# Patient Record
Sex: FEMALE | Race: WHITE | Employment: OTHER | URBAN - NONMETROPOLITAN AREA
[De-identification: names, ages, dates, MRNs, and addresses within clinical notes are randomized per-mention and may not be internally consistent; named-entity substitution may affect disease eponyms.]

---

## 2023-08-04 ENCOUNTER — NURSE ONLY (OUTPATIENT)
Age: 78
End: 2023-08-04

## 2023-08-04 LAB
ANION GAP SERPL CALC-SCNC: 13 MEQ/L (ref 8–16)
BUN SERPL-MCNC: 28 MG/DL (ref 7–22)
CALCIUM SERPL-MCNC: 9.8 MG/DL (ref 8.5–10.5)
CHLORIDE SERPL-SCNC: 101 MEQ/L (ref 98–111)
CO2 SERPL-SCNC: 28 MEQ/L (ref 23–33)
CREAT SERPL-MCNC: 0.8 MG/DL (ref 0.4–1.2)
GFR SERPL CREATININE-BSD FRML MDRD: > 60 ML/MIN/1.73M2
GLUCOSE SERPL-MCNC: 156 MG/DL (ref 70–108)
POTASSIUM SERPL-SCNC: 3.6 MEQ/L (ref 3.5–5.2)
SODIUM SERPL-SCNC: 142 MEQ/L (ref 135–145)

## 2023-11-13 ENCOUNTER — NURSE ONLY (OUTPATIENT)
Age: 78
End: 2023-11-13

## 2023-11-13 LAB
ALBUMIN SERPL BCG-MCNC: 3.9 G/DL (ref 3.5–5.1)
ALP SERPL-CCNC: 173 U/L (ref 38–126)
ALT SERPL W/O P-5'-P-CCNC: 17 U/L (ref 11–66)
ANION GAP SERPL CALC-SCNC: 12 MEQ/L (ref 8–16)
AST SERPL-CCNC: 17 U/L (ref 5–40)
BASOPHILS ABSOLUTE: 0.1 THOU/MM3 (ref 0–0.1)
BASOPHILS NFR BLD AUTO: 1 %
BILIRUB SERPL-MCNC: 0.3 MG/DL (ref 0.3–1.2)
BUN SERPL-MCNC: 28 MG/DL (ref 7–22)
CALCIUM SERPL-MCNC: 9.7 MG/DL (ref 8.5–10.5)
CHLORIDE SERPL-SCNC: 102 MEQ/L (ref 98–111)
CHOLEST SERPL-MCNC: 174 MG/DL (ref 100–199)
CO2 SERPL-SCNC: 30 MEQ/L (ref 23–33)
CREAT SERPL-MCNC: 0.9 MG/DL (ref 0.4–1.2)
DEPRECATED MEAN GLUCOSE BLD GHB EST-ACNC: 129 MG/DL (ref 70–126)
DEPRECATED RDW RBC AUTO: 48.1 FL (ref 35–45)
EOSINOPHIL NFR BLD AUTO: 3.2 %
EOSINOPHILS ABSOLUTE: 0.3 THOU/MM3 (ref 0–0.4)
ERYTHROCYTE [DISTWIDTH] IN BLOOD BY AUTOMATED COUNT: 14.9 % (ref 11.5–14.5)
GFR SERPL CREATININE-BSD FRML MDRD: > 60 ML/MIN/1.73M2
GLUCOSE SERPL-MCNC: 120 MG/DL (ref 70–108)
HBA1C MFR BLD HPLC: 6.3 % (ref 4.4–6.4)
HCT VFR BLD AUTO: 39.4 % (ref 37–47)
HDLC SERPL-MCNC: 49 MG/DL
HGB BLD-MCNC: 12.2 GM/DL (ref 12–16)
IMM GRANULOCYTES # BLD AUTO: 0.02 THOU/MM3 (ref 0–0.07)
IMM GRANULOCYTES NFR BLD AUTO: 0.2 %
LDLC SERPL CALC-MCNC: 104 MG/DL
LYMPHOCYTES ABSOLUTE: 1.8 THOU/MM3 (ref 1–4.8)
LYMPHOCYTES NFR BLD AUTO: 20.5 %
MCH RBC QN AUTO: 27.4 PG (ref 26–33)
MCHC RBC AUTO-ENTMCNC: 31 GM/DL (ref 32.2–35.5)
MCV RBC AUTO: 88.3 FL (ref 81–99)
MONOCYTES ABSOLUTE: 0.8 THOU/MM3 (ref 0.4–1.3)
MONOCYTES NFR BLD AUTO: 9.2 %
NEUTROPHILS NFR BLD AUTO: 65.9 %
NRBC BLD AUTO-RTO: 0 /100 WBC
PLATELET # BLD AUTO: 243 THOU/MM3 (ref 130–400)
PMV BLD AUTO: 11.8 FL (ref 9.4–12.4)
POTASSIUM SERPL-SCNC: 3.8 MEQ/L (ref 3.5–5.2)
PROT SERPL-MCNC: 6.8 G/DL (ref 6.1–8)
RBC # BLD AUTO: 4.46 MILL/MM3 (ref 4.2–5.4)
SEGMENTED NEUTROPHILS ABSOLUTE COUNT: 5.7 THOU/MM3 (ref 1.8–7.7)
SODIUM SERPL-SCNC: 144 MEQ/L (ref 135–145)
T4 FREE SERPL-MCNC: 1.09 NG/DL (ref 0.93–1.76)
TRIGL SERPL-MCNC: 106 MG/DL (ref 0–199)
TSH SERPL DL<=0.005 MIU/L-ACNC: 2.67 UIU/ML (ref 0.4–4.2)
VIT B12 SERPL-MCNC: 180 PG/ML (ref 211–911)
WBC # BLD AUTO: 8.7 THOU/MM3 (ref 4.8–10.8)

## 2023-11-15 LAB — PYRIDOXAL PHOS SERPL-SCNC: 8.5 NMOL/L (ref 20–125)

## 2023-11-16 ENCOUNTER — NURSE ONLY (OUTPATIENT)
Age: 78
End: 2023-11-16

## 2023-11-19 LAB — ALKALINE PHOSPHATASE ISOENZYMES: NORMAL

## 2024-01-17 ENCOUNTER — NURSE ONLY (OUTPATIENT)
Age: 79
End: 2024-01-17

## 2024-01-17 LAB — PLATELET # BLD: 331 10*3/UL

## 2024-01-22 LAB
A2 MACROGLOB SERPL-MCNC: 278 MG/DL (ref 131–293)
ALT SERPL-CCNC: 25 U/L (ref 5–40)
ANNOTATION COMMENT IMP: ABNORMAL
AST SERPL-CCNC: 22 U/L (ref 9–40)
BUN SERPL-MCNC: 25 MG/DL (ref 7–20)
FIBROSIS STAGE SERPL QL: ABNORMAL
GGT SERPL-CCNC: 24 U/L (ref 7–33)
LIVER FIBR SCORE SERPL CALC.FIBROMETER: 0.36
PATHOLOGY STUDY: ABNORMAL
PLATELET # BLD: 331 K/UL
PT INDEX PPP: 96 % (ref 90–120)
REF LAB TEST RESULTS: 0.01
REF LAB TEST RESULTS: 0.24
REF LAB TEST RESULTS: ABNORMAL

## 2024-02-14 ENCOUNTER — NURSE ONLY (OUTPATIENT)
Age: 79
End: 2024-02-14

## 2024-02-14 LAB
ALBUMIN SERPL BCG-MCNC: 4 G/DL (ref 3.5–5.1)
ALP SERPL-CCNC: 226 U/L (ref 38–126)
ALT SERPL W/O P-5'-P-CCNC: 27 U/L (ref 11–66)
ANION GAP SERPL CALC-SCNC: 13 MEQ/L (ref 8–16)
AST SERPL-CCNC: 21 U/L (ref 5–40)
BILIRUB SERPL-MCNC: 0.4 MG/DL (ref 0.3–1.2)
BUN SERPL-MCNC: 28 MG/DL (ref 7–22)
CALCIUM SERPL-MCNC: 10.1 MG/DL (ref 8.5–10.5)
CHLORIDE SERPL-SCNC: 100 MEQ/L (ref 98–111)
CO2 SERPL-SCNC: 29 MEQ/L (ref 23–33)
CREAT SERPL-MCNC: 0.9 MG/DL (ref 0.4–1.2)
GFR SERPL CREATININE-BSD FRML MDRD: > 60 ML/MIN/1.73M2
GLUCOSE SERPL-MCNC: 139 MG/DL (ref 70–108)
MAGNESIUM SERPL-MCNC: 1.9 MG/DL (ref 1.6–2.4)
POTASSIUM SERPL-SCNC: 3.7 MEQ/L (ref 3.5–5.2)
PROT SERPL-MCNC: 7.2 G/DL (ref 6.1–8)
SODIUM SERPL-SCNC: 142 MEQ/L (ref 135–145)

## 2024-03-08 ENCOUNTER — HOSPITAL ENCOUNTER (INPATIENT)
Age: 79
LOS: 3 days | Discharge: HOME HEALTH | DRG: 552 | End: 2024-03-11
Attending: STUDENT IN AN ORGANIZED HEALTH CARE EDUCATION/TRAINING PROGRAM | Admitting: ORTHOPAEDIC SURGERY
Payer: MEDICARE

## 2024-03-08 DIAGNOSIS — S22.069A CLOSED FRACTURE OF EIGHTH THORACIC VERTEBRA, UNSPECIFIED FRACTURE MORPHOLOGY, INITIAL ENCOUNTER (HCC): Primary | ICD-10-CM

## 2024-03-08 PROCEDURE — 1200000000 HC SEMI PRIVATE

## 2024-03-08 PROCEDURE — 6370000000 HC RX 637 (ALT 250 FOR IP): Performed by: STUDENT IN AN ORGANIZED HEALTH CARE EDUCATION/TRAINING PROGRAM

## 2024-03-08 PROCEDURE — 6820000001 HC L2 TRAUMA SURGERY EVALUATION: Performed by: ORTHOPAEDIC SURGERY

## 2024-03-08 PROCEDURE — 99285 EMERGENCY DEPT VISIT HI MDM: CPT

## 2024-03-08 RX ORDER — TRAMADOL HYDROCHLORIDE 50 MG/1
50 TABLET ORAL 2 TIMES DAILY PRN
Status: ON HOLD | COMMUNITY
Start: 2024-01-04 | End: 2024-03-11

## 2024-03-08 RX ORDER — ACETAMINOPHEN 500 MG
1000 TABLET ORAL ONCE
Status: COMPLETED | OUTPATIENT
Start: 2024-03-08 | End: 2024-03-08

## 2024-03-08 RX ORDER — OXYBUTYNIN CHLORIDE 5 MG/1
10 TABLET, EXTENDED RELEASE ORAL EVERY EVENING
COMMUNITY
Start: 2023-12-17

## 2024-03-08 RX ORDER — OXYBUTYNIN CHLORIDE 10 MG/1
5 TABLET, EXTENDED RELEASE ORAL DAILY
COMMUNITY
Start: 2024-01-04

## 2024-03-08 RX ORDER — MONTELUKAST SODIUM 10 MG/1
10 TABLET ORAL NIGHTLY
Status: ON HOLD | COMMUNITY
Start: 2024-01-04 | End: 2024-03-09

## 2024-03-08 RX ORDER — NYSTATIN 100000 U/G
1 CREAM TOPICAL 2 TIMES DAILY
COMMUNITY

## 2024-03-08 RX ORDER — POLYETHYLENE GLYCOL 3350 17 G/17G
17 POWDER, FOR SOLUTION ORAL DAILY
COMMUNITY
Start: 2024-01-04

## 2024-03-08 RX ORDER — PANTOPRAZOLE SODIUM 40 MG/1
40 TABLET, DELAYED RELEASE ORAL DAILY
COMMUNITY

## 2024-03-08 RX ORDER — SENNOSIDES 8.6 MG
650 CAPSULE ORAL EVERY 8 HOURS PRN
COMMUNITY
Start: 2024-01-04

## 2024-03-08 RX ORDER — POTASSIUM CHLORIDE 750 MG/1
10 TABLET, FILM COATED, EXTENDED RELEASE ORAL DAILY
COMMUNITY
Start: 2023-12-24

## 2024-03-08 RX ORDER — FUROSEMIDE 20 MG/1
20 TABLET ORAL DAILY
COMMUNITY
Start: 2024-01-04

## 2024-03-08 RX ORDER — LOSARTAN POTASSIUM AND HYDROCHLOROTHIAZIDE 25; 100 MG/1; MG/1
1 TABLET ORAL DAILY
COMMUNITY
Start: 2024-01-04

## 2024-03-08 RX ORDER — ATORVASTATIN CALCIUM 10 MG
10 TABLET ORAL DAILY
COMMUNITY
Start: 2024-01-04

## 2024-03-08 RX ORDER — GABAPENTIN 300 MG
300 CAPSULE ORAL 3 TIMES DAILY
COMMUNITY
Start: 2024-01-04

## 2024-03-08 RX ORDER — TRAZODONE HYDROCHLORIDE 50 MG/1
50 TABLET ORAL NIGHTLY
COMMUNITY
Start: 2024-01-04

## 2024-03-08 RX ORDER — DILTIAZEM HYDROCHLORIDE 120 MG/1
120 TABLET, FILM COATED ORAL DAILY
COMMUNITY
Start: 2024-01-04

## 2024-03-08 RX ADMIN — ACETAMINOPHEN 1000 MG: 500 TABLET ORAL at 23:13

## 2024-03-08 ASSESSMENT — PAIN - FUNCTIONAL ASSESSMENT: PAIN_FUNCTIONAL_ASSESSMENT: 0-10

## 2024-03-08 ASSESSMENT — PAIN SCALES - GENERAL: PAINLEVEL_OUTOF10: 5

## 2024-03-09 LAB
ANION GAP SERPL CALC-SCNC: 13 MEQ/L (ref 8–16)
BASOPHILS ABSOLUTE: 0.1 THOU/MM3 (ref 0–0.1)
BASOPHILS NFR BLD AUTO: 0.7 %
BUN SERPL-MCNC: 19 MG/DL (ref 7–22)
CALCIUM SERPL-MCNC: 9.7 MG/DL (ref 8.5–10.5)
CHLORIDE SERPL-SCNC: 98 MEQ/L (ref 98–111)
CO2 SERPL-SCNC: 26 MEQ/L (ref 23–33)
CREAT SERPL-MCNC: 0.9 MG/DL (ref 0.4–1.2)
DEPRECATED RDW RBC AUTO: 50 FL (ref 35–45)
EOSINOPHIL NFR BLD AUTO: 2.3 %
EOSINOPHILS ABSOLUTE: 0.2 THOU/MM3 (ref 0–0.4)
ERYTHROCYTE [DISTWIDTH] IN BLOOD BY AUTOMATED COUNT: 15.9 % (ref 11.5–14.5)
GFR SERPL CREATININE-BSD FRML MDRD: > 60 ML/MIN/1.73M2
GLUCOSE SERPL-MCNC: 114 MG/DL (ref 70–108)
HCT VFR BLD AUTO: 36.9 % (ref 37–47)
HGB BLD-MCNC: 11.9 GM/DL (ref 12–16)
IMM GRANULOCYTES # BLD AUTO: 0.02 THOU/MM3 (ref 0–0.07)
IMM GRANULOCYTES NFR BLD AUTO: 0.2 %
LYMPHOCYTES ABSOLUTE: 0.8 THOU/MM3 (ref 1–4.8)
LYMPHOCYTES NFR BLD AUTO: 8.3 %
MCH RBC QN AUTO: 28 PG (ref 26–33)
MCHC RBC AUTO-ENTMCNC: 32.2 GM/DL (ref 32.2–35.5)
MCV RBC AUTO: 86.8 FL (ref 81–99)
MONOCYTES ABSOLUTE: 0.9 THOU/MM3 (ref 0.4–1.3)
MONOCYTES NFR BLD AUTO: 9.8 %
NEUTROPHILS NFR BLD AUTO: 78.7 %
NRBC BLD AUTO-RTO: 0 /100 WBC
PLATELET # BLD AUTO: 298 THOU/MM3 (ref 130–400)
PMV BLD AUTO: 9.9 FL (ref 9.4–12.4)
POTASSIUM SERPL-SCNC: 4 MEQ/L (ref 3.5–5.2)
RBC # BLD AUTO: 4.25 MILL/MM3 (ref 4.2–5.4)
SEGMENTED NEUTROPHILS ABSOLUTE COUNT: 7.4 THOU/MM3 (ref 1.8–7.7)
SODIUM SERPL-SCNC: 137 MEQ/L (ref 135–145)
WBC # BLD AUTO: 9.4 THOU/MM3 (ref 4.8–10.8)

## 2024-03-09 PROCEDURE — 6360000002 HC RX W HCPCS: Performed by: PHYSICIAN ASSISTANT

## 2024-03-09 PROCEDURE — 6370000000 HC RX 637 (ALT 250 FOR IP): Performed by: PHYSICIAN ASSISTANT

## 2024-03-09 PROCEDURE — 36415 COLL VENOUS BLD VENIPUNCTURE: CPT

## 2024-03-09 PROCEDURE — 6370000000 HC RX 637 (ALT 250 FOR IP)

## 2024-03-09 PROCEDURE — 2580000003 HC RX 258: Performed by: PHYSICIAN ASSISTANT

## 2024-03-09 PROCEDURE — 1200000000 HC SEMI PRIVATE

## 2024-03-09 PROCEDURE — L0456 TLSO FLEX TRNK SJ-SS PRE CST: HCPCS

## 2024-03-09 PROCEDURE — 80048 BASIC METABOLIC PNL TOTAL CA: CPT

## 2024-03-09 PROCEDURE — 99222 1ST HOSP IP/OBS MODERATE 55: CPT | Performed by: PHYSICIAN ASSISTANT

## 2024-03-09 PROCEDURE — 85025 COMPLETE CBC W/AUTO DIFF WBC: CPT

## 2024-03-09 RX ORDER — SODIUM CHLORIDE 0.9 % (FLUSH) 0.9 %
10 SYRINGE (ML) INJECTION PRN
Status: DISCONTINUED | OUTPATIENT
Start: 2024-03-09 | End: 2024-03-11 | Stop reason: HOSPADM

## 2024-03-09 RX ORDER — PANTOPRAZOLE SODIUM 40 MG/1
40 TABLET, DELAYED RELEASE ORAL
Status: DISCONTINUED | OUTPATIENT
Start: 2024-03-09 | End: 2024-03-11 | Stop reason: HOSPADM

## 2024-03-09 RX ORDER — DILTIAZEM HYDROCHLORIDE 60 MG/1
120 TABLET, FILM COATED ORAL DAILY
Status: DISCONTINUED | OUTPATIENT
Start: 2024-03-09 | End: 2024-03-11 | Stop reason: HOSPADM

## 2024-03-09 RX ORDER — OXYBUTYNIN CHLORIDE 10 MG/1
10 TABLET, EXTENDED RELEASE ORAL NIGHTLY
Status: DISCONTINUED | OUTPATIENT
Start: 2024-03-09 | End: 2024-03-11 | Stop reason: HOSPADM

## 2024-03-09 RX ORDER — ATORVASTATIN CALCIUM 10 MG/1
10 TABLET, FILM COATED ORAL DAILY
Status: DISCONTINUED | OUTPATIENT
Start: 2024-03-09 | End: 2024-03-11 | Stop reason: HOSPADM

## 2024-03-09 RX ORDER — POTASSIUM CHLORIDE 20 MEQ/1
40 TABLET, EXTENDED RELEASE ORAL PRN
Status: DISCONTINUED | OUTPATIENT
Start: 2024-03-09 | End: 2024-03-11 | Stop reason: HOSPADM

## 2024-03-09 RX ORDER — FUROSEMIDE 20 MG/1
20 TABLET ORAL DAILY
Status: DISCONTINUED | OUTPATIENT
Start: 2024-03-09 | End: 2024-03-11 | Stop reason: HOSPADM

## 2024-03-09 RX ORDER — PNV NO.95/FERROUS FUM/FOLIC AC 28MG-0.8MG
325 TABLET ORAL DAILY
COMMUNITY

## 2024-03-09 RX ORDER — POLYETHYLENE GLYCOL 3350 17 G/17G
17 POWDER, FOR SOLUTION ORAL DAILY PRN
Status: DISCONTINUED | OUTPATIENT
Start: 2024-03-09 | End: 2024-03-11 | Stop reason: HOSPADM

## 2024-03-09 RX ORDER — ONDANSETRON 2 MG/ML
4 INJECTION INTRAMUSCULAR; INTRAVENOUS EVERY 6 HOURS PRN
Status: DISCONTINUED | OUTPATIENT
Start: 2024-03-09 | End: 2024-03-11 | Stop reason: HOSPADM

## 2024-03-09 RX ORDER — POTASSIUM CHLORIDE 7.45 MG/ML
10 INJECTION INTRAVENOUS PRN
Status: DISCONTINUED | OUTPATIENT
Start: 2024-03-09 | End: 2024-03-11 | Stop reason: HOSPADM

## 2024-03-09 RX ORDER — MAGNESIUM SULFATE IN WATER 40 MG/ML
2000 INJECTION, SOLUTION INTRAVENOUS PRN
Status: DISCONTINUED | OUTPATIENT
Start: 2024-03-09 | End: 2024-03-11 | Stop reason: HOSPADM

## 2024-03-09 RX ORDER — ENOXAPARIN SODIUM 100 MG/ML
30 INJECTION SUBCUTANEOUS 2 TIMES DAILY
Status: DISCONTINUED | OUTPATIENT
Start: 2024-03-09 | End: 2024-03-09

## 2024-03-09 RX ORDER — HYDROCHLOROTHIAZIDE 25 MG/1
25 TABLET ORAL DAILY
Status: DISCONTINUED | OUTPATIENT
Start: 2024-03-09 | End: 2024-03-11 | Stop reason: HOSPADM

## 2024-03-09 RX ORDER — FERROUS SULFATE 325(65) MG
325 TABLET ORAL DAILY
Status: DISCONTINUED | OUTPATIENT
Start: 2024-03-09 | End: 2024-03-11 | Stop reason: HOSPADM

## 2024-03-09 RX ORDER — LOSARTAN POTASSIUM 100 MG/1
100 TABLET ORAL DAILY
Status: DISCONTINUED | OUTPATIENT
Start: 2024-03-09 | End: 2024-03-11 | Stop reason: HOSPADM

## 2024-03-09 RX ORDER — ONDANSETRON 4 MG/1
4 TABLET, ORALLY DISINTEGRATING ORAL EVERY 8 HOURS PRN
Status: DISCONTINUED | OUTPATIENT
Start: 2024-03-09 | End: 2024-03-11 | Stop reason: HOSPADM

## 2024-03-09 RX ORDER — SULFAMETHOXAZOLE AND TRIMETHOPRIM 800; 160 MG/1; MG/1
1 TABLET ORAL EVERY 12 HOURS SCHEDULED
Status: DISCONTINUED | OUTPATIENT
Start: 2024-03-09 | End: 2024-03-11

## 2024-03-09 RX ORDER — ACETAMINOPHEN 650 MG/1
650 SUPPOSITORY RECTAL EVERY 6 HOURS PRN
Status: DISCONTINUED | OUTPATIENT
Start: 2024-03-09 | End: 2024-03-11 | Stop reason: HOSPADM

## 2024-03-09 RX ORDER — TRAMADOL HYDROCHLORIDE 50 MG/1
50 TABLET ORAL 2 TIMES DAILY PRN
Status: DISCONTINUED | OUTPATIENT
Start: 2024-03-09 | End: 2024-03-11 | Stop reason: HOSPADM

## 2024-03-09 RX ORDER — GABAPENTIN 300 MG/1
300 CAPSULE ORAL 3 TIMES DAILY
Status: DISCONTINUED | OUTPATIENT
Start: 2024-03-09 | End: 2024-03-11 | Stop reason: HOSPADM

## 2024-03-09 RX ORDER — NYSTATIN 100000 U/G
CREAM TOPICAL 2 TIMES DAILY
Status: DISCONTINUED | OUTPATIENT
Start: 2024-03-09 | End: 2024-03-11 | Stop reason: HOSPADM

## 2024-03-09 RX ORDER — LOSARTAN POTASSIUM AND HYDROCHLOROTHIAZIDE 25; 100 MG/1; MG/1
1 TABLET ORAL DAILY
Status: DISCONTINUED | OUTPATIENT
Start: 2024-03-09 | End: 2024-03-09 | Stop reason: CLARIF

## 2024-03-09 RX ORDER — SODIUM CHLORIDE 0.9 % (FLUSH) 0.9 %
10 SYRINGE (ML) INJECTION EVERY 12 HOURS SCHEDULED
Status: DISCONTINUED | OUTPATIENT
Start: 2024-03-09 | End: 2024-03-11 | Stop reason: HOSPADM

## 2024-03-09 RX ORDER — ACETAMINOPHEN 325 MG/1
650 TABLET ORAL EVERY 6 HOURS PRN
Status: DISCONTINUED | OUTPATIENT
Start: 2024-03-09 | End: 2024-03-11 | Stop reason: HOSPADM

## 2024-03-09 RX ORDER — TRAZODONE HYDROCHLORIDE 50 MG/1
50 TABLET ORAL NIGHTLY
Status: DISCONTINUED | OUTPATIENT
Start: 2024-03-09 | End: 2024-03-11 | Stop reason: HOSPADM

## 2024-03-09 RX ORDER — SODIUM CHLORIDE 9 MG/ML
INJECTION, SOLUTION INTRAVENOUS PRN
Status: DISCONTINUED | OUTPATIENT
Start: 2024-03-09 | End: 2024-03-11 | Stop reason: HOSPADM

## 2024-03-09 RX ORDER — LANOLIN ALCOHOL/MO/W.PET/CERES
6 CREAM (GRAM) TOPICAL NIGHTLY PRN
Status: DISCONTINUED | OUTPATIENT
Start: 2024-03-09 | End: 2024-03-11 | Stop reason: HOSPADM

## 2024-03-09 RX ADMIN — TRAMADOL HYDROCHLORIDE 50 MG: 50 TABLET ORAL at 07:40

## 2024-03-09 RX ADMIN — TRAZODONE HYDROCHLORIDE 50 MG: 50 TABLET ORAL at 20:37

## 2024-03-09 RX ADMIN — PANTOPRAZOLE SODIUM 40 MG: 40 TABLET, DELAYED RELEASE ORAL at 06:45

## 2024-03-09 RX ADMIN — SULFAMETHOXAZOLE AND TRIMETHOPRIM 1 TABLET: 800; 160 TABLET ORAL at 20:41

## 2024-03-09 RX ADMIN — FUROSEMIDE 20 MG: 20 TABLET ORAL at 08:43

## 2024-03-09 RX ADMIN — DILTIAZEM HYDROCHLORIDE 120 MG: 60 TABLET ORAL at 08:43

## 2024-03-09 RX ADMIN — OXYBUTYNIN CHLORIDE 10 MG: 10 TABLET, EXTENDED RELEASE ORAL at 20:37

## 2024-03-09 RX ADMIN — ENOXAPARIN SODIUM 30 MG: 100 INJECTION SUBCUTANEOUS at 03:17

## 2024-03-09 RX ADMIN — APIXABAN 5 MG: 5 TABLET, FILM COATED ORAL at 20:37

## 2024-03-09 RX ADMIN — GABAPENTIN 300 MG: 300 CAPSULE ORAL at 20:37

## 2024-03-09 RX ADMIN — GABAPENTIN 300 MG: 300 CAPSULE ORAL at 13:12

## 2024-03-09 RX ADMIN — SODIUM CHLORIDE, PRESERVATIVE FREE 10 ML: 5 INJECTION INTRAVENOUS at 20:38

## 2024-03-09 RX ADMIN — FERROUS SULFATE TAB 325 MG (65 MG ELEMENTAL FE) 325 MG: 325 (65 FE) TAB at 08:44

## 2024-03-09 RX ADMIN — HYDROCHLOROTHIAZIDE 25 MG: 25 TABLET ORAL at 08:43

## 2024-03-09 RX ADMIN — SODIUM CHLORIDE, PRESERVATIVE FREE 10 ML: 5 INJECTION INTRAVENOUS at 08:50

## 2024-03-09 RX ADMIN — NYSTATIN: 100000 CREAM TOPICAL at 08:46

## 2024-03-09 RX ADMIN — ATORVASTATIN CALCIUM 10 MG: 10 TABLET, FILM COATED ORAL at 08:44

## 2024-03-09 RX ADMIN — TRAMADOL HYDROCHLORIDE 50 MG: 50 TABLET ORAL at 18:49

## 2024-03-09 RX ADMIN — APIXABAN 5 MG: 5 TABLET, FILM COATED ORAL at 08:44

## 2024-03-09 RX ADMIN — LOSARTAN POTASSIUM 100 MG: 100 TABLET, FILM COATED ORAL at 08:43

## 2024-03-09 RX ADMIN — GABAPENTIN 300 MG: 300 CAPSULE ORAL at 08:43

## 2024-03-09 ASSESSMENT — PAIN DESCRIPTION - ORIENTATION
ORIENTATION: RIGHT
ORIENTATION: LOWER

## 2024-03-09 ASSESSMENT — PAIN SCALES - GENERAL
PAINLEVEL_OUTOF10: 6
PAINLEVEL_OUTOF10: 4
PAINLEVEL_OUTOF10: 5
PAINLEVEL_OUTOF10: 5

## 2024-03-09 ASSESSMENT — PAIN - FUNCTIONAL ASSESSMENT: PAIN_FUNCTIONAL_ASSESSMENT: ACTIVITIES ARE NOT PREVENTED

## 2024-03-09 ASSESSMENT — PAIN DESCRIPTION - DESCRIPTORS
DESCRIPTORS: THROBBING
DESCRIPTORS: ACHING;SHARP

## 2024-03-09 ASSESSMENT — PAIN DESCRIPTION - LOCATION
LOCATION: LEG
LOCATION: BACK
LOCATION: BACK;NECK

## 2024-03-09 NOTE — ED NOTES
Pt arrives to ED from [facility] with c/o T8 fracture. Pt states she fell at the beauty salon today, hit her head on the cement, loc for unknown amount of time. Pt was seen and treated at [place], transferred to [facility] for further trauma eval

## 2024-03-09 NOTE — PLAN OF CARE
Problem: Discharge Planning  Goal: Discharge to home or other facility with appropriate resources  3/9/2024 1348 by Letty Rose LPN  Outcome: Progressing  Note: Working with  and family for discharge  3/9/2024 0623 by Tabatha Boone RN  Outcome: Progressing  Flowsheets (Taken 3/9/2024 0623)  Discharge to home or other facility with appropriate resources:   Identify barriers to discharge with patient and caregiver   Arrange for needed discharge resources and transportation as appropriate   Identify discharge learning needs (meds, wound care, etc)     Problem: Safety - Adult  Goal: Free from fall injury  3/9/2024 1348 by Letty Rose LPN  Outcome: Progressing  Note: Bed and chair alarm on   3/9/2024 0623 by Tabatha Boone RN  Outcome: Progressing  Flowsheets (Taken 3/9/2024 0623)  Free From Fall Injury: Instruct family/caregiver on patient safety     Problem: ABCDS Injury Assessment  Goal: Absence of physical injury  3/9/2024 1348 by Letty Rose LPN  Outcome: Progressing  3/9/2024 0623 by Tabatha Boone RN  Outcome: Progressing  Flowsheets (Taken 3/9/2024 0623)  Absence of Physical Injury: Implement safety measures based on patient assessment     Problem: Skin/Tissue Integrity  Goal: Absence of new skin breakdown  Description: 1.  Monitor for areas of redness and/or skin breakdown  2.  Assess vascular access sites hourly  3.  Every 4-6 hours minimum:  Change oxygen saturation probe site  4.  Every 4-6 hours:  If on nasal continuous positive airway pressure, respiratory therapy assess nares and determine need for appliance change or resting period.  3/9/2024 1348 by Letty Rose LPN  Outcome: Progressing  Note: Weight shifts while in bed and chair   3/9/2024 0623 by Tabatha Boone RN  Outcome: Progressing     Problem: Pain  Goal: Verbalizes/displays adequate comfort level or baseline comfort level  Outcome: Progressing  Note: Denies needing pain medications at this time

## 2024-03-09 NOTE — PROGRESS NOTES
Home medication locked and secured in medicine cabinet. Patient aware. Will pass information to RN on report.

## 2024-03-09 NOTE — PLAN OF CARE
Problem: Discharge Planning  Goal: Discharge to home or other facility with appropriate resources  Outcome: Progressing  Flowsheets (Taken 3/9/2024 0623)  Discharge to home or other facility with appropriate resources:   Identify barriers to discharge with patient and caregiver   Arrange for needed discharge resources and transportation as appropriate   Identify discharge learning needs (meds, wound care, etc)     Problem: Safety - Adult  Goal: Free from fall injury  Outcome: Progressing  Flowsheets (Taken 3/9/2024 0623)  Free From Fall Injury: Instruct family/caregiver on patient safety     Problem: ABCDS Injury Assessment  Goal: Absence of physical injury  Outcome: Progressing  Flowsheets (Taken 3/9/2024 0623)  Absence of Physical Injury: Implement safety measures based on patient assessment     Problem: Skin/Tissue Integrity  Goal: Absence of new skin breakdown  Description: 1.  Monitor for areas of redness and/or skin breakdown  2.  Assess vascular access sites hourly  3.  Every 4-6 hours minimum:  Change oxygen saturation probe site  4.  Every 4-6 hours:  If on nasal continuous positive airway pressure, respiratory therapy assess nares and determine need for appliance change or resting period.  Outcome: Progressing     Care plan reviewed with patient.  Patient verbalizes understanding of the plan of care and contributes to goal setting.

## 2024-03-09 NOTE — ED NOTES
ED to inpatient nurses report      Chief Complaint:  No chief complaint on file.    Present to ED from: ome    MOA:     LOC: alert and orientated to name, place, date  Mobility: Requires assistance * 2  Oxygen Baseline:     Current needs required: Ra     Code Status:   No Order    What abnormal results were found and what did you give/do to treat them? Trauma transfer T8 transfer  Any procedures or intervention occur? See chart     Mental Status:  Level of Consciousness: Alert (0)    Psych Assessment:        Vitals:  Patient Vitals for the past 24 hrs:   BP Temp Temp src Pulse Resp SpO2 Height Weight   03/08/24 2252 (!) 144/61 -- -- 85 17 94 % -- --   03/08/24 2237 (!) 143/54 -- -- 82 18 94 % -- --   03/08/24 2222 (!) 141/42 -- -- 83 19 93 % -- --   03/08/24 2207 (!) 182/130 -- -- 87 20 93 % -- --   03/08/24 2107 (!) 154/59 98 °F (36.7 °C) Oral 87 18 100 % 1.6 m (5' 3\") 104.3 kg (230 lb)        LDAs:      Ambulatory Status:  Presents to emergency department  because of falls (Syncope, seizure, or loss of consciousness): Yes, Age > 70: Yes, Altered Mental Status, Intoxication with alcohol or substance confusion (Disorientation, impaired judgment, poor safety awaremess, or inability to follow instructions): No, Impaired Mobility: Ambulates or transfers with assistive devices or assistance; Unable to ambulate or transer.: Yes    Diagnosis:  DISPOSITION Admitted 03/08/2024 10:54:31 PM   Final diagnoses:   Closed fracture of eighth thoracic vertebra, unspecified fracture morphology, initial encounter (HCC)        Consults:  IP CONSULT TO HOSPITALIST     Pain Score:  Pain Assessment  Pain Assessment: 0-10  Pain Level: 5    C-SSRS:   Risk of Suicide: No Risk    Sepsis Screening:  Sepsis Risk Score: 0.92    [place] Fall Risk:  [place] 1 Fall Risk  Presents to emergency department  because of falls (Syncope, seizure, or loss of consciousness): Yes  Age > 70: Yes  Altered Mental Status, Intoxication with alcohol or

## 2024-03-09 NOTE — PROGRESS NOTES
[facility]  PHYSICAL THERAPY MISSED TREATMENT NOTE  ACUTE CARE  [facility] 7K              Missed Treatment     Pt does not have back brace at this time.  Nursing checking into orders for back brace.  PT to check back at a later time as able.   Problem: Falls - Risk of  Goal: *Absence of Falls  Document Az Fall Risk and appropriate interventions in the flowsheet. Outcome: Progressing Towards Goal  Fall Risk Interventions:  Mobility Interventions: Bed/chair exit alarm, Communicate number of staff needed for ambulation/transfer, Patient to call before getting OOB         Medication Interventions: Assess postural VS orthostatic hypotension, Bed/chair exit alarm, Patient to call before getting OOB    Elimination Interventions: Bed/chair exit alarm, Call light in reach, Patient to call for help with toileting needs    History of Falls Interventions: Bed/chair exit alarm, Door open when patient unattended, Investigate reason for fall        Problem: Pain  Goal: *Control of Pain  Outcome: Progressing Towards Goal  Using meds and assessment to keep pain at a tolerable level.

## 2024-03-09 NOTE — PROGRESS NOTES
Pt admitted to  7K23 from ED.     Complaints: fall, back pain.      No IV fluid infusing into the antecubital left, condition patent and no redness a IV site free of s/s of infection or infiltration. Vital signs obtained. Assessment and data collection initiated.     Two nurse skin assessment performed by Shannan RN and Oksana RN. Oriented to room.     Policies and procedures for 7 explained. All questions answered with no further questions at this time. Fall prevention and safety brochure discussed with patient.  Bed alarm on. Call light in reach.

## 2024-03-09 NOTE — CONSULTS
Hospitalist Consult History & Physical      Patient:  Melony Lincoln  YOB: 1945  MRN: [id number]     Acct: [id number]        ASSESSMENT/PLAN:    Closed fracture of eighth thoracic vertebra, unspecified fracture morphology, initial encounter  Managed per primary  Pain control   PT/OT consult     Primary hypertension  Continue diuretics and cardizem PO    Hx of DVT  Currently on eliquis     HLD   Statin     GERD  PPI    Thank you, Vidal Bridges MD, for the consultation.  Hospitalist service will  continue to follow along.      Electronically signed by JULIETA Islas on 3/9/2024 at 12:35 AM      ===================================================================      Chief Complaint:  med management for trauma spine fracture     Date of Service: Pt seen/examined in consultation on 03/09/24.       History Of Present Illness:  Melony Lincoln is a 79 y.o. female who we are asked to see/evaluate by Vidal Bridges MD for medical management of chronic conditions above. Patient resting comfortably and otherwise well. She fell earlier hitting the back her head. The patient was planned to discharge with a brace and sent home but due to inability to ambulate and advised to go to ED and ultimately admitted     Past Medical History:        Diagnosis Date    Arthritis        Past Surgical History:    History reviewed. No pertinent surgical history.    Medications Prior to Admission:    Prior to Admission medications    Medication Sig Start Date End Date Taking? Authorizing Provider   potassium chloride (KLOR-CON) 10 MEQ extended release tablet Take 1 tablet by mouth daily 12/24/23  Yes Rony Underwood MD   oxyBUTYnin (DITROPAN-XL) 5 MG extended release tablet Take 2 tablets by mouth every evening 12/17/23  Yes Rony Underwood MD   acetaminophen (TYLENOL 8 HOUR) 650 MG extended release tablet Take 1 tablet by mouth every 8 hours as needed for Pain 1/4/24  Yes Rony Underwood MD

## 2024-03-09 NOTE — H&P
Orthopedic Spine H&P  Department of Orthopedic Surgery  Albino Moore PA-C  Attending: Dr. Vidal Bridges    Chief Complaint: Fall, back pain    HPI:   Melony is 79 years of age and presents Today as a transfer from [place] emergency department for evaluation of a T8 fracture.  She took a fall yesterday, landing on her back.  She was initially evaluated at [facility] in the emergency department, and found to be nonambulatory due to significant back pain.  With this, she was transferred to [facility] for further care.  Melony is currently resting in bed stating that she has minimal back pain at rest, but pain is quite significant with any mobility.  She has been undergoing treatment for bilateral lower extremity cellulitis and does report that she is also being acutely treated for a DVT in the left lower extremity.  She is currently on Eliquis.  A T8 compression fracture was identified at the [facility].  She denies any symptoms of numbness or tingling in the lower extremity, with only change of her legs with swelling and redness due to the cellulitis for which she is currently taking antibiotics.  There has been no change in her bowel or bladder continence.    Assessment:   1: T8 compression fracture    Plan:   1: We will contact lumbar brace and limb for a TLSO brace.  Once she is braced, she can continue to advance activity and work with physical therapy.  We will progress until she is ready to discharge once mobilizing independently.    Allergies   Allergen Reactions    Pcn [Penicillins] Hives     Prior to Visit Medications    Medication Sig Taking? Authorizing Provider   Cyanocobalamin (VITAMIN B 12 PO) Take 1,000 mcg by mouth daily Yes ProviderRony MD   Ferrous Sulfate (IRON) 325 (65 Fe) MG TABS Take 325 mg by mouth daily Yes ProviderRony MD   potassium chloride (KLOR-CON) 10 MEQ extended release tablet Take 1 tablet by mouth daily Yes Rony Underwood MD   oxyBUTYnin (DITROPAN-XL)  extending to the inferior endplate surface, compatible with a nondisplaced fracture. No displaced fragments are seen. No subluxation..    IMPRESSION:   1. No acute fracture or subluxation of the cervical spine. 2. There is a nondisplaced fracture through the anterior cortex of the T8 vertebral body, extending to the inferior endplate surface. No displaced fragments are seen. No subluxation. Consider follow-up MRI of the thoracic spine to exclude spinal cord injury. Finding was discussed with Dr. Alexis Mayfield by telephone at 12:51 PM Central standard time on 3/8/2024 Electronically signed by:  Lydia Wheeler MD  03/08/2024 01:53 PM EST Workstation: [id number]    CT CERVICAL SPINE WO CONTRAST    Result Date: 3/8/2024  PROCEDURE: CT THORACIC SPINE WITHOUT IV CONTRAST, CT CERVICAL SPINE WITHOUT IV CONTRAST TECHNIQUE:  Computerized tomography of the cervical spine and thoracic spine was performed without contrast material. This exam was performed according to our department optimization program which includes automated exposure control, adjustment of the mA  and/or kV according to patient size, and/or use of iterative reconstruction technique. HISTORY:  Pain after fall COMPARISONS:  None. FINDINGS: Cervical spine: The vertebral body heights and alignment are preserved. There are osteoarthritic changes of the atlantodental articulation. Multilevel degenerative disc changes with disc space narrowing and osteophyte formation, most severe at C4-5. Multilevel facet arthritic changes are seen. No acute fracture or subluxation is seen. Thoracic spine: No scoliosis. The vertebral body heights and alignment are preserved. There is a linear lucency through the anterior cortex of the T8 vertebral body, extending to the inferior endplate surface, compatible with a nondisplaced fracture. No displaced fragments are seen. No subluxation..    IMPRESSION:   1. No acute fracture or subluxation of the cervical spine. 2. There is a

## 2024-03-09 NOTE — ED NOTES
ED nurse-to-nurse bedside report    No chief complaint on file.     LOC: alert and orientated to name, place, date  Vital signs   Vitals:    03/08/24 2352 03/08/24 2353 03/09/24 0022 03/09/24 0037   BP: (!) 152/55  (!) 120/50    Pulse: 93  79 76   Resp: 21 22 17 18   Temp:       TempSrc:       SpO2: 93% 92% 92% 91%   Weight:       Height:          Pain:    Pain Interventions: mar  Pain Goal: 0  Oxygen: No    Current needs required RA   Telemetry: Yes  LDAs:    Continuous Infusions:    sodium chloride       Mobility: Requires assistance * 2  [place] Fall Risk Score:        No data to display              Fall Interventions: socks call light   Report given to:

## 2024-03-09 NOTE — ED PROVIDER NOTES
[facility] EMERGENCY DEPT    Pt Name: Melony Lincoln  MRN: [id number]  Birthdate 1945  Date of evaluation: 3/8/2024  Resident Physician: Babs Barr MD  Supervising Physician: Dr. Chace Moreno DO    CHIEF COMPLAINT     No chief complaint on file.    HISTORY OF PRESENT ILLNESS   Melony Lincoln is a 79 y.o. female with PMHx of DVT on Eliquis  who presents to the emergency department for evaluation of T8 fx sp fall.     Patient is coming from [place] emergency department as a trauma transfer.  Patient was walking in triathlon earlier today when she slipped and fell backward striking the back of her head.  Unsure LOC.  Has a small abrasion to the back of her head.  Wound was cleansed and bleeding controlled at outside hospital.  Patient is on Eliquis for DVT.    Trauma scans show that she has an isolated T8 fracture.  The outside hospital did speak with Dr. Bridges, spine, stating okay for brace and discharged home.  However patient was unable to ambulate in the ED.  Patient also lives at home with her  who is nonambulatory.  No other assistance at home.    Patient does have her left lower extremity wrapped due to DVT/swelling.  There are signs of cellulitis.  States that she just got placed on antibiotic earlier today.    The patient has no other acute complaints at this time.    Review of Systems    Negative Except as Documented Above.    PASTMEDICAL HISTORY     Past Medical History:   Diagnosis Date    Arthritis        There is no problem list on file for this patient.    SURGICAL HISTORY     History reviewed. No pertinent surgical history.    CURRENT MEDICATIONS       Previous Medications    ACETAMINOPHEN (TYLENOL 8 HOUR) 650 MG EXTENDED RELEASE TABLET    Take 1 tablet by mouth every 8 hours as needed for Pain    APIXABAN (ELIQUIS) 5 MG TABS TABLET    Take 1 tablet by mouth daily    DILTIAZEM (CARDIZEM) 120 MG TABLET    Take 1 tablet by mouth daily    LASIX 20 MG TABLET    Take 1 tablet by mouth  pt with comorbid conditions, stating would like medicine admit and he will see tomorrow morning.     However, because this is traumatic injury. Will need to admit to trauma service per policy.     FINAL IMPRESSION      1. Closed fracture of eighth thoracic vertebra, unspecified fracture morphology, initial encounter ([facility])          DISPOSITION/PLAN   Condition: condition: stable  Dispo: Admit to spine  DISPOSITION Decision To Admit 03/08/2024 10:18:25 PM      Outpatient follow up (If applicable):  No follow-up provider specified.  Not applicable          DISCHARGE PRESCRIPTIONS: (None if blank)  New Prescriptions    No medications on file         This transcription was electronically signed. Parts of this transcriptions may have been dictated by use of voice recognition software and electronically transcribed, and parts may have been transcribed with the assistance of an ED scribe. The transcription may contain errors not detected in proofreading.  Please refer to my supervising physician's documentation if my documentation differs.    Electronically Signed: Babs Barr MD, 03/08/24, 10:41 PM

## 2024-03-10 LAB
ANION GAP SERPL CALC-SCNC: 15 MEQ/L (ref 8–16)
BUN SERPL-MCNC: 31 MG/DL (ref 7–22)
CALCIUM SERPL-MCNC: 9.5 MG/DL (ref 8.5–10.5)
CHLORIDE SERPL-SCNC: 99 MEQ/L (ref 98–111)
CO2 SERPL-SCNC: 24 MEQ/L (ref 23–33)
CREAT SERPL-MCNC: 1.2 MG/DL (ref 0.4–1.2)
GFR SERPL CREATININE-BSD FRML MDRD: 47 ML/MIN/1.73M2
GLUCOSE SERPL-MCNC: 124 MG/DL (ref 70–108)
POTASSIUM SERPL-SCNC: 3.7 MEQ/L (ref 3.5–5.2)
SODIUM SERPL-SCNC: 138 MEQ/L (ref 135–145)

## 2024-03-10 PROCEDURE — 2580000003 HC RX 258: Performed by: PHYSICIAN ASSISTANT

## 2024-03-10 PROCEDURE — 6370000000 HC RX 637 (ALT 250 FOR IP): Performed by: PHYSICIAN ASSISTANT

## 2024-03-10 PROCEDURE — 97162 PT EVAL MOD COMPLEX 30 MIN: CPT

## 2024-03-10 PROCEDURE — 97166 OT EVAL MOD COMPLEX 45 MIN: CPT

## 2024-03-10 PROCEDURE — 36415 COLL VENOUS BLD VENIPUNCTURE: CPT

## 2024-03-10 PROCEDURE — 6370000000 HC RX 637 (ALT 250 FOR IP)

## 2024-03-10 PROCEDURE — 99232 SBSQ HOSP IP/OBS MODERATE 35: CPT

## 2024-03-10 PROCEDURE — 80048 BASIC METABOLIC PNL TOTAL CA: CPT

## 2024-03-10 PROCEDURE — 97530 THERAPEUTIC ACTIVITIES: CPT

## 2024-03-10 PROCEDURE — 97535 SELF CARE MNGMENT TRAINING: CPT

## 2024-03-10 PROCEDURE — 1200000000 HC SEMI PRIVATE

## 2024-03-10 RX ORDER — POTASSIUM CHLORIDE 750 MG/1
10 TABLET, FILM COATED, EXTENDED RELEASE ORAL DAILY
Status: DISCONTINUED | OUTPATIENT
Start: 2024-03-10 | End: 2024-03-11 | Stop reason: HOSPADM

## 2024-03-10 RX ADMIN — TRAZODONE HYDROCHLORIDE 50 MG: 50 TABLET ORAL at 19:59

## 2024-03-10 RX ADMIN — FERROUS SULFATE TAB 325 MG (65 MG ELEMENTAL FE) 325 MG: 325 (65 FE) TAB at 10:42

## 2024-03-10 RX ADMIN — POTASSIUM CHLORIDE 10 MEQ: 750 TABLET, FILM COATED, EXTENDED RELEASE ORAL at 10:42

## 2024-03-10 RX ADMIN — SULFAMETHOXAZOLE AND TRIMETHOPRIM 1 TABLET: 800; 160 TABLET ORAL at 20:00

## 2024-03-10 RX ADMIN — PANTOPRAZOLE SODIUM 40 MG: 40 TABLET, DELAYED RELEASE ORAL at 05:50

## 2024-03-10 RX ADMIN — LOSARTAN POTASSIUM 100 MG: 100 TABLET, FILM COATED ORAL at 10:42

## 2024-03-10 RX ADMIN — TRAMADOL HYDROCHLORIDE 50 MG: 50 TABLET ORAL at 20:04

## 2024-03-10 RX ADMIN — ATORVASTATIN CALCIUM 10 MG: 10 TABLET, FILM COATED ORAL at 10:42

## 2024-03-10 RX ADMIN — GABAPENTIN 300 MG: 300 CAPSULE ORAL at 10:48

## 2024-03-10 RX ADMIN — APIXABAN 5 MG: 5 TABLET, FILM COATED ORAL at 19:59

## 2024-03-10 RX ADMIN — OXYBUTYNIN CHLORIDE 10 MG: 10 TABLET, EXTENDED RELEASE ORAL at 19:59

## 2024-03-10 RX ADMIN — HYDROCHLOROTHIAZIDE 25 MG: 25 TABLET ORAL at 10:42

## 2024-03-10 RX ADMIN — SULFAMETHOXAZOLE AND TRIMETHOPRIM 1 TABLET: 800; 160 TABLET ORAL at 10:48

## 2024-03-10 RX ADMIN — GABAPENTIN 300 MG: 300 CAPSULE ORAL at 19:59

## 2024-03-10 RX ADMIN — DILTIAZEM HYDROCHLORIDE 120 MG: 60 TABLET ORAL at 10:42

## 2024-03-10 RX ADMIN — APIXABAN 5 MG: 5 TABLET, FILM COATED ORAL at 10:49

## 2024-03-10 RX ADMIN — FUROSEMIDE 20 MG: 20 TABLET ORAL at 10:42

## 2024-03-10 RX ADMIN — SODIUM CHLORIDE, PRESERVATIVE FREE 10 ML: 5 INJECTION INTRAVENOUS at 19:59

## 2024-03-10 RX ADMIN — SODIUM CHLORIDE, PRESERVATIVE FREE 10 ML: 5 INJECTION INTRAVENOUS at 10:42

## 2024-03-10 ASSESSMENT — PAIN SCALES - GENERAL
PAINLEVEL_OUTOF10: 0
PAINLEVEL_OUTOF10: 5
PAINLEVEL_OUTOF10: 0

## 2024-03-10 ASSESSMENT — PAIN DESCRIPTION - DESCRIPTORS: DESCRIPTORS: THROBBING

## 2024-03-10 ASSESSMENT — PAIN DESCRIPTION - LOCATION: LOCATION: BACK

## 2024-03-10 ASSESSMENT — PAIN DESCRIPTION - ORIENTATION: ORIENTATION: LOWER

## 2024-03-10 ASSESSMENT — PAIN - FUNCTIONAL ASSESSMENT: PAIN_FUNCTIONAL_ASSESSMENT: ACTIVITIES ARE NOT PREVENTED

## 2024-03-10 NOTE — PROGRESS NOTES
[facility]  INPATIENT OCCUPATIONAL THERAPY  [facility] 7K  EVALUATION    Time:    Time In: 815  Time Out: 842  Timed Code Treatment Minutes: 15 Minutes  Minutes: 27          Date: 3/10/2024  Patient Name: Melony Lincoln,   Gender: female      MRN: [id number]  : 1945  (79 y.o.)  Referring Practitioner: Luis Eduardo Weinberg PA  Diagnosis: Closed fracture of eighth thoracic vertebra, unspecified fracture morphology, initial encounter  Additional Pertinent Hx: Melony is 79 years of age and presents Today as a transfer from [place] emergency department for evaluation of a T8 fracture.  She took a fall yesterday, landing on her back.  [facility] and found to be nonambulatory due to significant back pain.  With this, she was transferred to [facility] for further care.  Melony is currently resting in bed stating that she has minimal back pain at rest, but pain is quite significant with any mobility.  She has been undergoing treatment for bilateral lower extremity cellulitis and does report that she is also being acutely treated for a DVT in the left lower extremity.  She is currently on Eliquis.  A T8 compression fracture was identified at the [facility].  She denies any symptoms of numbness or tingling in the lower extremity, with only change of her legs with swelling and redness due to the cellulitis for which she is currently taking antibiotics.  There has been no change in her bowel or bladder continence    Restrictions/Precautions:  Restrictions/Precautions: Fall Risk, General Precautions  Position Activity Restriction  Spinal Precautions: No Bending, No Lifting, No Twisting  Spinal Precautions: T8 Fracture  Other position/activity restrictions: she is also being acutely treated for a DVT in the left lower extremity.  She is currently on Eliquis    Subjective  Chart Reviewed: Yes, Orders, Progress Notes  Patient assessed for   Pt states she struggled with lower surfaces prior to arrival   Stand to Sit: Stand By Assistance.      FUNCTIONAL MOBILITY:  Assistive Device: Rolling Walker  Assist Level:  Stand By Assistance and Contact Guard Assistance.   Distance: To and from bathroom and and a lap in the hallway       Activity Tolerance:  Patient tolerance of  treatment: Good treatment tolerance       Modified Davin Scale:  Not Applicable    Assessment:  Assessment: Pt presents to occupational therapy following T8 fracture s/p fall. Pt demonstrates weakness impacting patient's ability to complete self care at prior level of functioning. Pt currently requires min A for footwear mgt, SBA for toileting, CGA for transfers and min A for transfers from lower surfaces.  Due to patient's performance deficits, patient would greatly benefit from continued occupational therapy for ADL remediation, endurance building, strengthening and adaptive techniques to return to prior level of functioning and safe return to home environment.     Performance deficits / Impairments: Decreased functional mobility , Decreased ADL status, Decreased balance, Decreased safe awareness  Prognosis: Good  REQUIRES OT FOLLOW-UP: Yes  Decision Making: Medium Complexity    Treatment Initiated: Treatment and education initiated within context of evaluation.  Evaluation time included review of current medical information, gathering information related to past medical, social and functional history, completion of standardized testing, formal and informal observation of tasks, assessment of data and development of plan of care and goals.  Treatment time included skilled education and facilitation of tasks to increase safety and independence with ADL's for improved functional independence and quality of life.    Discharge Recommendations:  Home with Home health OT (Pt was recieving  nursing PTA)    Patient Education:     Patient Education  Education Given To: Patient  Education

## 2024-03-10 NOTE — PLAN OF CARE
Problem: Discharge Planning  Goal: Discharge to home or other facility with appropriate resources  3/9/2024 2158 by Tabatha Boone RN  Outcome: Progressing  Flowsheets (Taken 3/9/2024 2158)  Discharge to home or other facility with appropriate resources:   Identify barriers to discharge with patient and caregiver   Arrange for needed discharge resources and transportation as appropriate   Identify discharge learning needs (meds, wound care, etc)     Problem: Safety - Adult  Goal: Free from fall injury  3/9/2024 2158 by Tabatha Boone RN  Outcome: Progressing  Flowsheets (Taken 3/9/2024 2158)  Free From Fall Injury: Instruct family/caregiver on patient safety     Problem: ABCDS Injury Assessment  Goal: Absence of physical injury  3/9/2024 2158 by Tabatha Boone RN  Outcome: Progressing  Flowsheets (Taken 3/9/2024 2158)  Absence of Physical Injury: Implement safety measures based on patient assessment     Problem: Skin/Tissue Integrity  Goal: Absence of new skin breakdown  Description: 1.  Monitor for areas of redness and/or skin breakdown  2.  Assess vascular access sites hourly  3.  Every 4-6 hours minimum:  Change oxygen saturation probe site  4.  Every 4-6 hours:  If on nasal continuous positive airway pressure, respiratory therapy assess nares and determine need for appliance change or resting period.  3/9/2024 2158 by Tabatha Boone RN  Outcome: Progressing     Problem: Pain  Goal: Verbalizes/displays adequate comfort level or baseline comfort level  3/9/2024 2158 by Tabatha Boone RN  Outcome: Progressing  Flowsheets (Taken 3/9/2024 2158)  Verbalizes/displays adequate comfort level or baseline comfort level:   Encourage patient to monitor pain and request assistance   Assess pain using appropriate pain scale   Administer analgesics based on type and severity of pain and evaluate response   Implement non-pharmacological measures as appropriate and evaluate response     Care plan reviewed with  patient.  Patient verbalizes understanding of the plan of care and contributes to goal setting.

## 2024-03-10 NOTE — PROGRESS NOTES
[facility]  INPATIENT PHYSICAL THERAPY  EVALUATION  [facility] ORTHOPEDICS 7K - 7K-23/023-A    Time In: 905  Time Out: 930  Timed Code Treatment Minutes: 10 Minutes  Minutes: 25          Date: 3/10/2024  Patient Name: Melony Lincoln,  Gender:  female        MRN: [id number]  : 1945  (79 y.o.)  Referral Date : 24   Referring Practitioner: Luis Eduardo Weinberg PA  Diagnosis: Closed fracture of eighth thoracic vertebra, unspecified fracture morphology, initial encounter  Additional Pertinent Hx: Per H&P:Melony is 79 years of age and presents Today as a transfer from [place] emergency department for evaluation of a T8 fracture.  She took a fall yesterday, landing on her back.  She was initially evaluated at [facility] in the emergency department, and found to be nonambulatory due to significant back pain.  With this, she was transferred to [facility] for further care.  Melony is currently resting in bed stating that she has minimal back pain at rest, but pain is quite significant with any mobility.  She has been undergoing treatment for bilateral lower extremity cellulitis and does report that she is also being acutely treated for a DVT in the left lower extremity.  She is currently on Eliquis.  A T8 compression fracture was identified at the [facility].     Restrictions/Precautions:  Restrictions/Precautions: Fall Risk, General Precautions  Position Activity Restriction  Spinal Precautions: No Bending, No Lifting, No Twisting  Spinal Precautions: T8 Fracture  Other position/activity restrictions: she is also being acutely treated for a DVT in the left lower extremity.  She is currently on Eliquis    Subjective:  Chart Reviewed: Yes  Patient assessed for rehabilitation services?: Yes  Family / Caregiver Present: No  Subjective: Patient in room in recliner, agreeable to PT.  RN okay with PT session.    General:  Overall Orientation Status: Within Functional Limits  Orientation Level: Oriented  patient to return to home safely.  Therapy Prognosis: Good  Co-morbidities-arthritis, DVT, neuropathy    Requires PT Follow-Up: Yes    Discharge Recommendations:  Discharge Recommendations: Continue to assess pending progress, Home with Home health PT    Patient Education:  Education  Education Given To: Patient  Education Provided: Role of Therapy, Plan of Care, Precautions, Mobility Training, Safety  Education Method: Verbal  Barriers to Learning: None  Education Outcome: Verbalized understanding, Demonstrated understanding, Continued education needed   .            Equipment Recommendations:  Equipment Needed: No    Plan:  Current Treatment Recommendations: Strengthening, Balance training, Functional mobility training, Transfer training, Neuromuscular re-education, Gait training, Stair training, Endurance training, Home exercise program, Safety education & training, Equipment evaluation, education, & procurement, Patient/Caregiver education & training, Therapeutic activities, Pain management  General Plan:  (5-6xO)    Goals:  Patient Goals : go home  Short Term Goals  Time Frame for Short Term Goals: at discharge  Short Term Goal 1: Patient will complete supine < > sit with head of bed flat and no rails with SBA to transfer in and out of bed with log roll technique safely.  Short Term Goal 2: Patient will complete sit < > stand with modified independence to stand to ambulate safely.  Short Term Goal 3: Patient will ambulate 150' with a RW and modified independence to navigate home safely.  Short Term Goal 4: Patient will ascend/descend 4 steps with one hand rail with CGA for safe home entry.  Long Term Goals  Time Frame for Long Term Goals : N/A due to short estimated length of stay    Following session, patient left in safe position with all fall risk precautions in place.

## 2024-03-10 NOTE — PLAN OF CARE
Problem: Discharge Planning  Goal: Discharge to home or other facility with appropriate resources  Outcome: Progressing     Problem: Safety - Adult  Goal: Free from fall injury  Outcome: Progressing  Flowsheets (Taken 3/10/2024 1400)  Free From Fall Injury: Instruct family/caregiver on patient safety     Problem: ABCDS Injury Assessment  Goal: Absence of physical injury  Outcome: Progressing  Flowsheets (Taken 3/10/2024 1400)  Absence of Physical Injury: Implement safety measures based on patient assessment     Problem: Skin/Tissue Integrity  Goal: Absence of new skin breakdown  Description: 1.  Monitor for areas of redness and/or skin breakdown  2.  Assess vascular access sites hourly  3.  Every 4-6 hours minimum:  Change oxygen saturation probe site  4.  Every 4-6 hours:  If on nasal continuous positive airway pressure, respiratory therapy assess nares and determine need for appliance change or resting period.  Outcome: Progressing     Problem: Pain  Goal: Verbalizes/displays adequate comfort level or baseline comfort level  Outcome: Progressing  Flowsheets (Taken 3/10/2024 1030)  Verbalizes/displays adequate comfort level or baseline comfort level: Encourage patient to monitor pain and request assistance

## 2024-03-10 NOTE — PROGRESS NOTES
Hospitalist Progress Note    Patient:  Melony Lincoln    YOB: 1945  Unit/Bed:7K-23/023-A  Date of Admission: 3/8/2024  Code Status: Full Code      Assessment/Plan:    Closed fracture of the eighth thoracic vertebrae/mechanical fall: Management primary, plan for TLSO brace.  Then can work with PT/OT.    Bilateral lower extremity cellulitis, improving: Was placed on Bactrim outpatient 3/5, continue for total of 10 days.    Posterior scalp soft tissue swelling laceration: Glued at outside ER.  Slightly oozing blood.  Monitor    Essential hypertension: Continue Cardizem, Lasix    Subacute DVT: Diagnosed in February 2024, on Eliquis    HLD: Statin    GERD: PPI      Chief Complaint: Fall/back pain    HPI / Hospital Course:   Per H&P 3/9 \"Melony is 79 years of age and presents Today as a transfer from [place] emergency department for evaluation of a T8 fracture. She took a fall yesterday, landing on her back. She was initially evaluated at [facility] in the emergency department, and found to be nonambulatory due to significant back pain. With this, she was transferred to [facility] for further care. Melony is currently resting in bed stating that she has minimal back pain at rest, but pain is quite significant with any mobility. She has been undergoing treatment for bilateral lower extremity cellulitis and does report that she is also being acutely treated for a DVT in the left lower extremity. She is currently on Eliquis. A T8 compression fracture was identified at the [facility]. She denies any symptoms of numbness or tingling in the lower extremity, with only change of her legs with swelling and redness due to the cellulitis for which she is currently taking antibiotics. There has been no change in her bowel or bladder continence. \"    Subjective (past 24 hours):   3/10: Patient reports feeling well today.  Endorses some back pain. States bilateral lower extremities pain/redness and  \"BLOODCULT2\"  Organism:No results found for: \"ORG\"    No results found for: \"LABGRAM\"  MRSA culture only:No results found for: \"MRSAC\"  Respiratory culture: No results found for: \"CULTRESP\"  Aerobic and Anaerobic :  No results found for: \"LABAERO\"  No results found for: \"LABANAE\"    Radiology Reports:  No orders to display     CT THORACIC SPINE WO CONTRAST    Result Date: 3/8/2024  PROCEDURE: CT THORACIC SPINE WITHOUT IV CONTRAST, CT CERVICAL SPINE WITHOUT IV CONTRAST TECHNIQUE:  Computerized tomography of the cervical spine and thoracic spine was performed without contrast material. This exam was performed according to our department optimization program which includes automated exposure control, adjustment of the mA  and/or kV according to patient size, and/or use of iterative reconstruction technique. HISTORY:  Pain after fall COMPARISONS:  None. FINDINGS: Cervical spine: The vertebral body heights and alignment are preserved. There are osteoarthritic changes of the atlantodental articulation. Multilevel degenerative disc changes with disc space narrowing and osteophyte formation, most severe at C4-5. Multilevel facet arthritic changes are seen. No acute fracture or subluxation is seen. Thoracic spine: No scoliosis. The vertebral body heights and alignment are preserved. There is a linear lucency through the anterior cortex of the T8 vertebral body, extending to the inferior endplate surface, compatible with a nondisplaced fracture. No displaced fragments are seen. No subluxation..    IMPRESSION:   1. No acute fracture or subluxation of the cervical spine. 2. There is a nondisplaced fracture through the anterior cortex of the T8 vertebral body, extending to the inferior endplate surface. No displaced fragments are seen. No subluxation. Consider follow-up MRI of the thoracic spine to exclude spinal cord injury. Finding was discussed with Dr. Alexis Mayfield by telephone at 12:51 PM Central standard time on 3/8/2024

## 2024-03-11 VITALS
OXYGEN SATURATION: 98 % | RESPIRATION RATE: 16 BRPM | HEIGHT: 63 IN | TEMPERATURE: 98.3 F | SYSTOLIC BLOOD PRESSURE: 102 MMHG | WEIGHT: 230 LBS | HEART RATE: 86 BPM | BODY MASS INDEX: 40.75 KG/M2 | DIASTOLIC BLOOD PRESSURE: 82 MMHG

## 2024-03-11 PROCEDURE — 99232 SBSQ HOSP IP/OBS MODERATE 35: CPT

## 2024-03-11 PROCEDURE — 97530 THERAPEUTIC ACTIVITIES: CPT

## 2024-03-11 PROCEDURE — 97116 GAIT TRAINING THERAPY: CPT

## 2024-03-11 PROCEDURE — 2580000003 HC RX 258: Performed by: PHYSICIAN ASSISTANT

## 2024-03-11 PROCEDURE — 6370000000 HC RX 637 (ALT 250 FOR IP)

## 2024-03-11 PROCEDURE — 97535 SELF CARE MNGMENT TRAINING: CPT

## 2024-03-11 PROCEDURE — 6370000000 HC RX 637 (ALT 250 FOR IP): Performed by: PHYSICIAN ASSISTANT

## 2024-03-11 RX ORDER — SULFAMETHOXAZOLE AND TRIMETHOPRIM 800; 160 MG/1; MG/1
1 TABLET ORAL EVERY 12 HOURS SCHEDULED
Status: DISCONTINUED | OUTPATIENT
Start: 2024-03-05 | End: 2024-03-11 | Stop reason: HOSPADM

## 2024-03-11 RX ORDER — TRAMADOL HYDROCHLORIDE 50 MG/1
50 TABLET ORAL EVERY 4 HOURS
Qty: 42 TABLET | Refills: 0 | Status: SHIPPED | OUTPATIENT
Start: 2024-03-11 | End: 2024-03-18

## 2024-03-11 RX ADMIN — LOSARTAN POTASSIUM 100 MG: 100 TABLET, FILM COATED ORAL at 08:56

## 2024-03-11 RX ADMIN — ATORVASTATIN CALCIUM 10 MG: 10 TABLET, FILM COATED ORAL at 08:55

## 2024-03-11 RX ADMIN — GABAPENTIN 300 MG: 300 CAPSULE ORAL at 13:37

## 2024-03-11 RX ADMIN — FERROUS SULFATE TAB 325 MG (65 MG ELEMENTAL FE) 325 MG: 325 (65 FE) TAB at 08:56

## 2024-03-11 RX ADMIN — SODIUM CHLORIDE, PRESERVATIVE FREE 10 ML: 5 INJECTION INTRAVENOUS at 08:57

## 2024-03-11 RX ADMIN — PANTOPRAZOLE SODIUM 40 MG: 40 TABLET, DELAYED RELEASE ORAL at 04:58

## 2024-03-11 RX ADMIN — POTASSIUM CHLORIDE 10 MEQ: 750 TABLET, FILM COATED, EXTENDED RELEASE ORAL at 08:58

## 2024-03-11 RX ADMIN — GABAPENTIN 300 MG: 300 CAPSULE ORAL at 08:55

## 2024-03-11 RX ADMIN — ACETAMINOPHEN 650 MG: 325 TABLET ORAL at 05:02

## 2024-03-11 RX ADMIN — FUROSEMIDE 20 MG: 20 TABLET ORAL at 08:55

## 2024-03-11 RX ADMIN — HYDROCHLOROTHIAZIDE 25 MG: 25 TABLET ORAL at 08:56

## 2024-03-11 RX ADMIN — DILTIAZEM HYDROCHLORIDE 120 MG: 60 TABLET ORAL at 08:55

## 2024-03-11 RX ADMIN — SULFAMETHOXAZOLE AND TRIMETHOPRIM 1 TABLET: 800; 160 TABLET ORAL at 09:25

## 2024-03-11 RX ADMIN — APIXABAN 5 MG: 5 TABLET, FILM COATED ORAL at 08:56

## 2024-03-11 ASSESSMENT — PAIN DESCRIPTION - LOCATION: LOCATION: LEG;ANKLE

## 2024-03-11 ASSESSMENT — PAIN DESCRIPTION - DESCRIPTORS: DESCRIPTORS: SHARP

## 2024-03-11 ASSESSMENT — PAIN DESCRIPTION - ORIENTATION: ORIENTATION: RIGHT

## 2024-03-11 ASSESSMENT — PAIN SCALES - GENERAL
PAINLEVEL_OUTOF10: 2
PAINLEVEL_OUTOF10: 5

## 2024-03-11 ASSESSMENT — PAIN - FUNCTIONAL ASSESSMENT: PAIN_FUNCTIONAL_ASSESSMENT: ACTIVITIES ARE NOT PREVENTED

## 2024-03-11 NOTE — DISCHARGE INSTRUCTIONS
Bactrim was discontinued on your medication list as you have the medication at home.  Please continue to take it at home twice daily through 3/14/2024.  Do not continue to take it even if you have extra medication as you will have completed 10 days of the medication at this time.  Please follow-up with your primary care provider regarding the cellulitis and the laceration on your scalp.

## 2024-03-11 NOTE — PROGRESS NOTES
[facility]  INPATIENT PHYSICAL THERAPY  [facility] ORTHOPEDICS 7K - 7K-23/023-A  Daily Note    Time In: 1407  Time Out: 1435  Timed Code Treatment Minutes: 28 Minutes  Minutes: 28          Date: 3/11/2024  Patient Name: Melony Lincoln,  Gender:  female        MRN: [id number]  : 1945  (79 y.o.)  Referral Date : 24  Referring Practitioner: Luis Eduardo Weinberg PA  Diagnosis: Closed fracture of eighth thoracic vertebra, unspecified fracture morphology, initial encounter  Additional Pertinent Hx: Per H&P:Melony is 79 years of age and presents Today as a transfer from [place] emergency department for evaluation of a T8 fracture.  She took a fall yesterday, landing on her back.  She was initially evaluated at [facility] in the emergency department, and found to be nonambulatory due to significant back pain.  With this, she was transferred to [facility] for further care.  Melony is currently resting in bed stating that she has minimal back pain at rest, but pain is quite significant with any mobility.  She has been undergoing treatment for bilateral lower extremity cellulitis and does report that she is also being acutely treated for a DVT in the left lower extremity.  She is currently on Eliquis.  A T8 compression fracture was identified at the [facility].     Prior Level of Function:  Lives With: Spouse  Type of Home: House  Home Layout: One level  Home Access: Stairs to enter with rails  Entrance Stairs - Rails: Right  Home Equipment: Cane, Lift chair, Walker, 4 wheeled, Reacher   Bathroom Shower/Tub: Tub/Shower unit (sponge bathing recently)  Bathroom Toilet: Handicap height  Bathroom Equipment: Grab bars in shower, Grab bars around toilet  Bathroom Accessibility: Walker accessible    Receives Help From: Family  ADL Assistance: Independent  Homemaking Assistance: Independent  Ambulation Assistance: Independent  Transfer Assistance: Independent  Active : No  Additional Comments: Pt reports  assistance with stairs when returning home   Balance:  Static Sitting Balance:  Stand By Assistance  Static Standing Balance: Contact Guard Assistance  Dynamic Standing Balance: Contact Guard Assistance    Neuromuscular Re-education  None    Exercise:  *HEP given for seated exercises with verbal directions and demonstration with verbalized understanding at this time.     Functional Outcome Measures:   [facility] (6 CLICK) BASIC MOBILITY  AM-PAC Inpatient Mobility Raw Score : 18  AM-PAC Inpatient T-Scale Score : 43.63  Mobility Inpatient CMS 0-100% Score: 46.58  Mobility Inpatient CMS G-Code Modifier : CK     Modified [place] Scale:  Not Applicable     ASSESSMENT:  Assessment: Patient progressing toward established goals.  Activity Tolerance:  Patient tolerance of  treatment: good.     Equipment Recommendations:Equipment Needed: No  Discharge Recommendations:  Continue to assess pending progress, 24 hour assistance or supervision, and Patient would benefit from continued PT at discharge    PLAN: Current Treatment Recommendations: Strengthening, Balance training, Functional mobility training, Transfer training, Neuromuscular re-education, Gait training, Stair training, Endurance training, Home exercise program, Safety education & training, Equipment evaluation, education, & procurement, Patient/Caregiver education & training, Therapeutic activities, Pain management  General Plan:  (5-6xO)    Patient Education  Patient Education: Plan of Care, Functional Mobility, Reviewed Prior Education, Health Promotion and Wellness Education, Safety, Verbal Exercise Instruction, Transfers, Gait, Stairs    Goals:  Patient Goals : go home  Short Term Goals  Time Frame for Short Term Goals: at discharge  Short Term Goal 1: Patient will complete supine < > sit with head of bed flat and no rails with SBA to transfer in and out of bed with log roll technique safely.  Short Term Goal 2: Patient will complete sit < > stand with modified

## 2024-03-11 NOTE — PROGRESS NOTES
Department of Orthopedic Surgery  Spine Service  Albino Moore PA-C Progress Note        Subjective:    3/10/2024  Melony is resting in the chair.  She does have her TLSO brace.  She does report that she is able to mobilize fairly well, but transitional movements are still difficult.  Continue to work with PT OT until ready to discharge home.    3/11/24  Melony is resting in bed. Wasn't able to get up much for a walk yesterday due to staff availability. Will work with therapy today. Possible discharge if mobilizing well. Will continue to monitor.     Vitals  VITALS:  BP (!) 119/56   Pulse 76   Temp 98.2 °F (36.8 °C) (Oral)   Resp 17   Ht 1.6 m (5' 3\")   Wt 104.3 kg (230 lb)   LMP  (LMP Unknown)   SpO2 97%   BMI 40.74 kg/m²   24HR INTAKE/OUTPUT:    Intake/Output Summary (Last 24 hours) at 3/11/2024 0700  Last data filed at 3/11/2024 0655  Gross per 24 hour   Intake 430 ml   Output --   Net 430 ml     URINARY CATHETER OUTPUT (Masters):     DRAIN/TUBE OUTPUT:         PHYSICAL EXAM:    Orientation:  alert and oriented to person, place and time    Lower Extremity Motor :  quadriceps, extensor hallucis longus, dorsiflexion, plantarflexion 5/5 bilaterally  Lower Extremity Sensory:  Intact L1-S1    Flatus:  positive    ABNORMAL EXAM FINDINGS:  none    LABS:    HgB:    Lab Results   Component Value Date/Time    HGB 11.9 03/09/2024 07:40 AM         ASSESSMENT AND PLAN:    T8 compression fracture    1: Use TLSO brace when up and walking.  2:  Activity Level: Advance activity with physical therapy  3:  Pain Control: Controlled  4:  Discharge Planning: Discharge home once mobilizing well.  Will continue to follow therapy notes

## 2024-03-11 NOTE — PLAN OF CARE
Problem: Discharge Planning  Goal: Discharge to home or other facility with appropriate resources  3/11/2024 0104 by Tabatha Boone RN  Outcome: Progressing  Flowsheets (Taken 3/11/2024 0104)  Discharge to home or other facility with appropriate resources:   Identify barriers to discharge with patient and caregiver   Arrange for needed discharge resources and transportation as appropriate   Identify discharge learning needs (meds, wound care, etc)     Problem: Safety - Adult  Goal: Free from fall injury  3/11/2024 0104 by Tabatha Boone RN  Outcome: Progressing  Flowsheets (Taken 3/11/2024 0104)  Free From Fall Injury: Instruct family/caregiver on patient safety     Problem: ABCDS Injury Assessment  Goal: Absence of physical injury  3/11/2024 0104 by Tabatha Boone RN  Outcome: Progressing  Flowsheets (Taken 3/11/2024 0104)  Absence of Physical Injury: Implement safety measures based on patient assessment     Problem: Skin/Tissue Integrity  Goal: Absence of new skin breakdown  Description: 1.  Monitor for areas of redness and/or skin breakdown  2.  Assess vascular access sites hourly  3.  Every 4-6 hours minimum:  Change oxygen saturation probe site  4.  Every 4-6 hours:  If on nasal continuous positive airway pressure, respiratory therapy assess nares and determine need for appliance change or resting period.  3/11/2024 0104 by Tabatha Boone RN  Outcome: Progressing     Problem: Pain  Goal: Verbalizes/displays adequate comfort level or baseline comfort level  3/11/2024 0104 by Tabatha Boone RN  Outcome: Progressing  Flowsheets (Taken 3/11/2024 0104)  Verbalizes/displays adequate comfort level or baseline comfort level:   Encourage patient to monitor pain and request assistance   Assess pain using appropriate pain scale   Implement non-pharmacological measures as appropriate and evaluate response   Administer analgesics based on type and severity of pain and evaluate response     Care plan

## 2024-03-11 NOTE — PROGRESS NOTES
[facility]  STR ORTHOPEDICS 7K  Occupational Therapy  Daily Note  Time:   Time In: 0758  Time Out: 0840  Timed Code Treatment Minutes: 42 Minutes  Minutes: 42          Date: 3/11/2024  Patient Name: Melony Lincoln,   Gender: female      Room: [facility]23/023-A  MRN: [id number]  : 1945  (79 y.o.)  Referring Practitioner: Luis Eduardo Weinberg PA  Diagnosis: Closed fracture of eighth thoracic vertebra, unspecified fracture morphology, initial encounter  Additional Pertinent Hx: Melony is 79 years of age and presents Today as a transfer from [place] emergency department for evaluation of a T8 fracture.  She took a fall yesterday, landing on her back.  She was initially evaluated at [facility] in the emergency department, and found to be nonambulatory due to significant back pain.  With this, she was transferred to [facility] for further care.  Melony is currently resting in bed stating that she has minimal back pain at rest, but pain is quite significant with any mobility.  She has been undergoing treatment for bilateral lower extremity cellulitis and does report that she is also being acutely treated for a DVT in the left lower extremity.  She is currently on Eliquis.  A T8 compression fracture was identified at the [facility].  She denies any symptoms of numbness or tingling in the lower extremity, with only change of her legs with swelling and redness due to the cellulitis for which she is currently taking antibiotics.  There has been no change in her bowel or bladder continence    Restrictions/Precautions:  Restrictions/Precautions: Fall Risk, General Precautions  Position Activity Restriction  Spinal Precautions: No Bending, No Lifting, No Twisting  Spinal Precautions: T8 Fracture  Other position/activity restrictions: she is also being acutely treated for a DVT in the left lower extremity.  She is currently on Eliquis     Social/Functional History:  Lives With: Spouse  Type of Home: House  Home  Guard Assistance. To the recliner    FUNCTIONAL MOBILITY:  Assistive Device: Rolling Walker  Assist Level:  Contact Guard Assistance.   Distance: To and from bathroom and Household distances within unit     ADDITIONAL ACTIVITIES:    Pt unable to recall back precautions this date    AM-[facility] Inpatient Daily Activity Raw Score: 18  AM-PAC Inpatient ADL T-Scale Score : 38.66  ADL Inpatient CMS 0-100% Score: 46.65    Modified [place] Scale:  Not Applicable    ASSESSMENT:     Activity Tolerance:  Patient tolerance of  treatment: Good treatment tolerance      Discharge Recommendations: Subacute/skilled nursing facility  Equipment Recommendations: Other: Pt normally sponge bathes at baseline due to previous falls. Has an elevated toilet in bathroom with toilet safety frame and multiple reachers. No further AE recommended.  Plan: Times Per Week: 6x  Current Treatment Recommendations: Balance training, Functional mobility training, Endurance training, Patient/Caregiver education & training, Safety education & training, Self-Care / ADL, Home management training, Equipment evaluation, education, & procurement    Education:  Learners: Patient  ADL's and Precautions    Goals  Short Term Goals  Time Frame for Short Term Goals: By discharge  Short Term Goal 1: Pt will complete LB dressing tasks with use of LHAE prn and SBA to improve indep with donning slip on shoes at home.  Short Term Goal 2: Pt will manage her TLSO brace on/off with SBA to improve indep with UB dressing.  Short Term Goal 3: Pt will use RW to complete light IADL task with supervision and 1-2 vcs for spinal precautions to improve indep with home mgt tasks.    Following session, patient left in safe position with all fall risk precautions in place.

## 2024-03-11 NOTE — PROGRESS NOTES
1420 - SW in to complete TRAUMA SBIRT. Patient not in room; CASPER SW to reattempt at a later time

## 2024-03-11 NOTE — CARE COORDINATION
Case Management Assessment  Initial Evaluation    Date/Time of Evaluation: 3/11/2024 12:11 PM  Assessment Completed by: Elvie Rodriguez RN    If patient is discharged prior to next notation, then this note serves as note for discharge by case management.    Patient Name: Melony Lincoln                   YOB: 1945  Diagnosis: Closed fracture of eighth thoracic vertebra, unspecified fracture morphology, initial encounter ([facility]) [S22.069A]                   Date / Time: 3/8/2024  9:02 PM  Location: [address] [place] [address]     Patient Admission Status: Inpatient   Readmission Risk Low 0-14, Mod 15-19), High > 20: Readmission Risk Score: 9.8    Current PCP: Medina Dean MD  PCP verified by ? Yes    Chart Reviewed: Yes      History Provided by: Patient  Patient Orientation: Alert and Oriented    Patient Cognition: Alert    Hospitalization in the last 30 days (Readmission):  No    If yes, Readmission Assessment in  Navigator will be completed.    Advance Directives:      Code Status: Full Code   Patient's Primary Decision Maker is: Patient Declined (Legal Next of Kin Remains as Decision Maker)      Discharge Planning:    Patient lives with: Spouse/Significant Other Type of Home: House  Primary Care Giver: Self  Patient Support Systems include: Spouse/Significant Other, Children, Family Members   Current Financial resources: Medicare  Current community resources: None  Current services prior to admission: Durable Medical Equipment            Current DME: Cane, Walker, Other (Comment), Brace/Splint (grabber, grab bars in bathroom, lift chair)            Type of Home Care services:  PT, OT, Nursing Services    ADLS  Prior functional level: Independent in ADLs/IADLs  Current functional level: Independent in ADLs/IADLs    Family can provide assistance at DC: Yes  Would you like Case Management to discuss the discharge plan with any other family members/significant others, and if so, who? No  Plans to Return to Present  Housing: Yes  Other Identified Issues/Barriers to RETURNING to current housing: none  Potential Assistance needed at discharge: Home Care            Potential DME:    Patient expects to discharge to: House  Plan for transportation at discharge: Family    Financial    Payor: [employer] MEDICARE / Plan: [employer] GOLD PLUS HMO / Product Type: *No Product type* /     Does insurance require precert for SNF: Yes    Potential assistance Purchasing Medications: No  Meds-to-Beds request: Yes    No Pharmacies Listed    Notes:    Factors facilitating achievement of predicted outcomes: Family support, Good insight into deficits, and Has needed Durable Medical Equipment at home    Barriers to discharge: Pain control and increase mobilization.    Additional Case Management Notes: Pt presented to ED, sent by Dr Brigdes for medical management of spinal fracture of T8. Fell at [employer], hitting head. Admitted by Dr Bridges. TLSO brace ordered for ambulation. Pain controlled with PRN PO tylenol and tramadol. Working with therapy. Possible dc today.     Procedure:   3/8 CT Thoracic spine: There is a nondisplaced fracture through the anterior cortex of the T8 vertebral body, extending to the inferior endplate surface. No displaced fragments are seen. No subluxation.     The Plan for Transition of Care is related to the following treatment goals of Closed fracture of eighth thoracic vertebra, unspecified fracture morphology, initial encounter ([facility]) [N45.427P]    Patient Goals/Plan/Treatment Preferences: Melony is from home with her . She is independent in all ADL's, though reports lately she has been doing sponge baths instead of showering.  She has extensive DME as noted above. Gave pt list of  agencies. Wants to dc with [facility] . Referral made.     Post-acute (PAC) provider list was provided to patient. Patient was informed of their freedom to choose PAC provider. Discussed and offered to show the patient the relevant PAC

## 2024-03-11 NOTE — PROGRESS NOTES
Hospitalist Progress Note    Patient:  Melony Lincoln    YOB: 1945  Unit/Bed:7K-23/023-A  Date of Admission: 3/8/2024  Code Status: Full Code      Assessment/Plan:    Closed fracture of the eighth thoracic vertebrae/mechanical fall: Management primary, plan for TLSO brace.  Then can work with PT/OT.    Bilateral lower extremity cellulitis, improving: Was placed on Bactrim outpatient 3/5, continue for total of 10 days.    Posterior scalp soft tissue swelling laceration: Glued at outside ER.  Scabbed over.  Monitor    Essential hypertension: Continue Cardizem, Lasix    Subacute DVT: Diagnosed in February 2024, on Eliquis    HLD: Statin    GERD: PPI      Chief Complaint: Fall/back pain    HPI / Hospital Course:   Per H&P 3/9 \"Melony is 79 years of age and presents Today as a transfer from [place] emergency department for evaluation of a T8 fracture. She took a fall yesterday, landing on her back. She was initially evaluated at [facility] in the emergency department, and found to be nonambulatory due to significant back pain. With this, she was transferred to [facility] for further care. Melony is currently resting in bed stating that she has minimal back pain at rest, but pain is quite significant with any mobility. She has been undergoing treatment for bilateral lower extremity cellulitis and does report that she is also being acutely treated for a DVT in the left lower extremity. She is currently on Eliquis. A T8 compression fracture was identified at the [facility]. She denies any symptoms of numbness or tingling in the lower extremity, with only change of her legs with swelling and redness due to the cellulitis for which she is currently taking antibiotics. There has been no change in her bowel or bladder continence. \"    Subjective (past 24 hours):   3/11: Patient reports feeling well today, hoping to go home.  Notes that she has been walking as often as able but will get stiff if  PM EST Workstation: [id number]    CT HEAD WITHOUT CONTRAST    Result Date: 3/8/2024  PROCEDURE:  CT HEAD WITHOUT IV CONTRAST TECHNIQUE:  Computerized tomography of the head was performed without contrast material. This exam was performed according to our department optimization program which includes automated exposure control, adjustment of the mA and/or kV according to patient size, and/or use of iterative reconstruction technique. HISTORY:  Traumatic brain injury COMPARISONS:  None. FINDINGS: Skull and scalp:  There is posterior scalp soft tissue swelling in laceration. No acute fracture is seen. Paranasal sinuses:  Opacification of the right sphenoid sinus. Mastoids are aerated. Ventricles and subarachnoid spaces:  No hydrocephalus. Cerebrum:  No CT evidence of intracranial mass, hemorrhage, or acute territorial infarction.There are bilateral chronic microvascular ischemic changes. Cerebellum and brainstem:  No evidence of hemorrhage, acute infarction or mass. Other:  The visualized globes and orbits demonstrate no acute abnormality.    IMPRESSION:   1. No CT evidence of acute intracranial abnormality. 2. Posterior scalp soft tissue swelling and laceration. 3. Right sphenoid sinus disease. Electronically signed by:  Lydia Wheeler MD  03/08/2024 01:18 PM EST Workstation: [id number]      Electronically signed by Lori Corona PA-C on 3/11/2024 at 8:53 AM

## 2024-03-21 NOTE — DISCHARGE SUMMARY
[address] [address]                            DISCHARGE SUMMARY      PATIENT NAME: PATIENCE HUMPHRIES                : 1945  MED REC NO: [id number]                       ROOM: [facility]  ACCOUNT NO: [id number]                       ADMIT DATE: 2024  PROVIDER: JULIETA Tse      DISCHARGE DIAGNOSIS:  T8 compression fracture.    HOSPITAL COURSE:  The patient is a 79-year-old female, who presented to [facility] as a transfer from [place] [facility] for evaluation of a T8 fracture.  She took a fall on the day prior to her presentation on 2024 at home and was evaluated at [facility].  At that time, [place] did contact Dr. Bridges directly where the patient was unable to mobilize safely and therefore was transferred to [facility] for further care.  She was seen here at the hospital.  We placed an order for fitting for a TLSO brace, which was placed and she was able to begin progressing with physical therapy.  She showed better stability of the fracture and better mobility in general and was able to mobilize over the next several days.  She showed improvement with her overall pain control and was feeling better.  Hospitalist consult was placed and the patient was followed with the hospitalist team in the hospital managing her stable medical conditions.  The patient was able to discharge home on the date of 2024.    DISCHARGE MEDICATIONS:  Tramadol.    DISCHARGE DISPOSITION:  Home.    DISCHARGE CONDITION:  Stable.          JULIETA TSE      D:  2024 14:28:58     T:  2024 02:19:48     CONNIE/SHAYLEE  Job #:  [id number]     Doc#:  [id number]    CC:   Medina Dean MD

## 2024-04-23 ENCOUNTER — NURSE ONLY (OUTPATIENT)
Age: 79
End: 2024-04-23

## 2024-04-23 LAB
ALBUMIN SERPL BCG-MCNC: 3.9 G/DL (ref 3.5–5.1)
ALP SERPL-CCNC: 193 U/L (ref 38–126)
ALT SERPL W/O P-5'-P-CCNC: 19 U/L (ref 11–66)
ANION GAP SERPL CALC-SCNC: 16 MEQ/L (ref 8–16)
AST SERPL-CCNC: 20 U/L (ref 5–40)
BASOPHILS ABSOLUTE: 0.1 THOU/MM3 (ref 0–0.1)
BASOPHILS NFR BLD AUTO: 1.1 %
BILIRUB SERPL-MCNC: 0.3 MG/DL (ref 0.3–1.2)
BUN SERPL-MCNC: 25 MG/DL (ref 7–22)
CALCIUM SERPL-MCNC: 9.7 MG/DL (ref 8.5–10.5)
CHLORIDE SERPL-SCNC: 102 MEQ/L (ref 98–111)
CHOLEST SERPL-MCNC: 171 MG/DL (ref 100–199)
CO2 SERPL-SCNC: 26 MEQ/L (ref 23–33)
CREAT SERPL-MCNC: 0.8 MG/DL (ref 0.4–1.2)
DEPRECATED MEAN GLUCOSE BLD GHB EST-ACNC: 132 MG/DL (ref 70–126)
DEPRECATED RDW RBC AUTO: 50.2 FL (ref 35–45)
EOSINOPHIL NFR BLD AUTO: 2.6 %
EOSINOPHILS ABSOLUTE: 0.2 THOU/MM3 (ref 0–0.4)
ERYTHROCYTE [DISTWIDTH] IN BLOOD BY AUTOMATED COUNT: 15.2 % (ref 11.5–14.5)
GFR SERPL CREATININE-BSD FRML MDRD: 76 ML/MIN/1.73M2
GLUCOSE SERPL-MCNC: 125 MG/DL (ref 70–108)
HBA1C MFR BLD HPLC: 6.4 % (ref 4.4–6.4)
HCT VFR BLD AUTO: 40.6 % (ref 37–47)
HDLC SERPL-MCNC: 50 MG/DL
HGB BLD-MCNC: 12.7 GM/DL (ref 12–16)
IMM GRANULOCYTES # BLD AUTO: 0.04 THOU/MM3 (ref 0–0.07)
IMM GRANULOCYTES NFR BLD AUTO: 0.4 %
LDLC SERPL CALC-MCNC: 94 MG/DL
LYMPHOCYTES ABSOLUTE: 1.6 THOU/MM3 (ref 1–4.8)
LYMPHOCYTES NFR BLD AUTO: 17.6 %
MCH RBC QN AUTO: 28.2 PG (ref 26–33)
MCHC RBC AUTO-ENTMCNC: 31.3 GM/DL (ref 32.2–35.5)
MCV RBC AUTO: 90 FL (ref 81–99)
MONOCYTES ABSOLUTE: 0.8 THOU/MM3 (ref 0.4–1.3)
MONOCYTES NFR BLD AUTO: 8.7 %
NEUTROPHILS NFR BLD AUTO: 69.6 %
NRBC BLD AUTO-RTO: 0 /100 WBC
PLATELET # BLD AUTO: 264 THOU/MM3 (ref 130–400)
PMV BLD AUTO: 11.2 FL (ref 9.4–12.4)
POTASSIUM SERPL-SCNC: 3.6 MEQ/L (ref 3.5–5.2)
PROT SERPL-MCNC: 7.5 G/DL (ref 6.1–8)
RBC # BLD AUTO: 4.51 MILL/MM3 (ref 4.2–5.4)
SEGMENTED NEUTROPHILS ABSOLUTE COUNT: 6.5 THOU/MM3 (ref 1.8–7.7)
SODIUM SERPL-SCNC: 144 MEQ/L (ref 135–145)
T4 FREE SERPL-MCNC: 1.24 NG/DL (ref 0.93–1.68)
TRIGL SERPL-MCNC: 135 MG/DL (ref 0–199)
TSH SERPL DL<=0.005 MIU/L-ACNC: 2.48 UIU/ML (ref 0.4–4.2)
WBC # BLD AUTO: 9.3 THOU/MM3 (ref 4.8–10.8)

## 2024-04-24 LAB
CREAT UR-MCNC: 76 MG/DL
MICROALBUMIN UR-MCNC: < 1.2 MG/DL
MICROALBUMIN/CREAT RATIO PNL UR: 16 MG/G (ref 0–30)

## 2024-04-25 LAB
BACTERIA SPEC AEROBE CULT: ABNORMAL
GRAM STN SPEC: ABNORMAL
ORGANISM: ABNORMAL

## 2024-05-15 ENCOUNTER — NURSE ONLY (OUTPATIENT)
Age: 79
End: 2024-05-15

## 2024-05-15 LAB
ALBUMIN SERPL BCG-MCNC: 3.8 G/DL (ref 3.5–5.1)
ALP SERPL-CCNC: 162 U/L (ref 38–126)
ALT SERPL W/O P-5'-P-CCNC: 17 U/L (ref 11–66)
ANION GAP SERPL CALC-SCNC: 13 MEQ/L (ref 8–16)
AST SERPL-CCNC: 14 U/L (ref 5–40)
BASOPHILS ABSOLUTE: 0.1 THOU/MM3 (ref 0–0.1)
BASOPHILS NFR BLD AUTO: 0.9 %
BILIRUB SERPL-MCNC: 0.4 MG/DL (ref 0.3–1.2)
BUN SERPL-MCNC: 26 MG/DL (ref 7–22)
CALCIUM SERPL-MCNC: 9.9 MG/DL (ref 8.5–10.5)
CHLORIDE SERPL-SCNC: 102 MEQ/L (ref 98–111)
CO2 SERPL-SCNC: 25 MEQ/L (ref 23–33)
CREAT SERPL-MCNC: 1 MG/DL (ref 0.4–1.2)
DEPRECATED RDW RBC AUTO: 47.3 FL (ref 35–45)
EOSINOPHIL NFR BLD AUTO: 3.4 %
EOSINOPHILS ABSOLUTE: 0.3 THOU/MM3 (ref 0–0.4)
ERYTHROCYTE [DISTWIDTH] IN BLOOD BY AUTOMATED COUNT: 15 % (ref 11.5–14.5)
GFR SERPL CREATININE-BSD FRML MDRD: 58 ML/MIN/1.73M2
GLUCOSE SERPL-MCNC: 119 MG/DL (ref 70–108)
HCT VFR BLD AUTO: 39.2 % (ref 37–47)
HGB BLD-MCNC: 12.7 GM/DL (ref 12–16)
IMM GRANULOCYTES # BLD AUTO: 0.02 THOU/MM3 (ref 0–0.07)
IMM GRANULOCYTES NFR BLD AUTO: 0.2 %
LIPASE SERPL-CCNC: 13.4 U/L (ref 5.6–51.3)
LYMPHOCYTES ABSOLUTE: 1.1 THOU/MM3 (ref 1–4.8)
LYMPHOCYTES NFR BLD AUTO: 12.1 %
MCH RBC QN AUTO: 28.4 PG (ref 26–33)
MCHC RBC AUTO-ENTMCNC: 32.4 GM/DL (ref 32.2–35.5)
MCV RBC AUTO: 87.7 FL (ref 81–99)
MONOCYTES ABSOLUTE: 0.7 THOU/MM3 (ref 0.4–1.3)
MONOCYTES NFR BLD AUTO: 8.2 %
NEUTROPHILS ABSOLUTE: 6.5 THOU/MM3 (ref 1.8–7.7)
NEUTROPHILS NFR BLD AUTO: 75.2 %
NRBC BLD AUTO-RTO: 0 /100 WBC
PLATELET # BLD AUTO: 295 THOU/MM3 (ref 130–400)
PMV BLD AUTO: 10.7 FL (ref 9.4–12.4)
POTASSIUM SERPL-SCNC: 2.8 MEQ/L (ref 3.5–5.2)
PROT SERPL-MCNC: 7.6 G/DL (ref 6.1–8)
RBC # BLD AUTO: 4.47 MILL/MM3 (ref 4.2–5.4)
SODIUM SERPL-SCNC: 140 MEQ/L (ref 135–145)
WBC # BLD AUTO: 8.7 THOU/MM3 (ref 4.8–10.8)

## 2024-06-03 ENCOUNTER — NURSE ONLY (OUTPATIENT)
Age: 79
End: 2024-06-03

## 2024-06-03 LAB
ANION GAP SERPL CALC-SCNC: 13 MEQ/L (ref 8–16)
BUN SERPL-MCNC: 23 MG/DL (ref 7–22)
CALCIUM SERPL-MCNC: 10.1 MG/DL (ref 8.5–10.5)
CHLORIDE SERPL-SCNC: 104 MEQ/L (ref 98–111)
CO2 SERPL-SCNC: 27 MEQ/L (ref 23–33)
CREAT SERPL-MCNC: 0.8 MG/DL (ref 0.4–1.2)
GFR SERPL CREATININE-BSD FRML MDRD: 76 ML/MIN/1.73M2
GLUCOSE SERPL-MCNC: 107 MG/DL (ref 70–108)
POTASSIUM SERPL-SCNC: 3.6 MEQ/L (ref 3.5–5.2)
SODIUM SERPL-SCNC: 144 MEQ/L (ref 135–145)

## 2024-07-23 ENCOUNTER — LAB (OUTPATIENT)
Age: 79
End: 2024-07-23

## 2024-07-23 LAB
ALBUMIN SERPL BCG-MCNC: 4.1 G/DL (ref 3.5–5.1)
ALP SERPL-CCNC: 183 U/L (ref 38–126)
ALT SERPL W/O P-5'-P-CCNC: 104 U/L (ref 11–66)
ANION GAP SERPL CALC-SCNC: 15 MEQ/L (ref 8–16)
AST SERPL-CCNC: 42 U/L (ref 5–40)
BASOPHILS ABSOLUTE: 0.1 THOU/MM3 (ref 0–0.1)
BASOPHILS NFR BLD AUTO: 0.8 %
BILIRUB SERPL-MCNC: 0.4 MG/DL (ref 0.3–1.2)
BUN SERPL-MCNC: 48 MG/DL (ref 7–22)
CALCIUM SERPL-MCNC: 9.9 MG/DL (ref 8.5–10.5)
CHLORIDE SERPL-SCNC: 101 MEQ/L (ref 98–111)
CO2 SERPL-SCNC: 26 MEQ/L (ref 23–33)
CREAT SERPL-MCNC: 0.8 MG/DL (ref 0.4–1.2)
DEPRECATED RDW RBC AUTO: 46.5 FL (ref 35–45)
EOSINOPHIL NFR BLD AUTO: 1.8 %
EOSINOPHILS ABSOLUTE: 0.2 THOU/MM3 (ref 0–0.4)
ERYTHROCYTE [DISTWIDTH] IN BLOOD BY AUTOMATED COUNT: 14.5 % (ref 11.5–14.5)
GFR SERPL CREATININE-BSD FRML MDRD: 76 ML/MIN/1.73M2
GLUCOSE SERPL-MCNC: 119 MG/DL (ref 70–108)
HCT VFR BLD AUTO: 43.3 % (ref 37–47)
HGB BLD-MCNC: 13.7 GM/DL (ref 12–16)
IMM GRANULOCYTES # BLD AUTO: 0.09 THOU/MM3 (ref 0–0.07)
IMM GRANULOCYTES NFR BLD AUTO: 0.8 %
LYMPHOCYTES ABSOLUTE: 1.6 THOU/MM3 (ref 1–4.8)
LYMPHOCYTES NFR BLD AUTO: 13.8 %
MCH RBC QN AUTO: 28.1 PG (ref 26–33)
MCHC RBC AUTO-ENTMCNC: 31.6 GM/DL (ref 32.2–35.5)
MCV RBC AUTO: 88.7 FL (ref 81–99)
MONOCYTES ABSOLUTE: 1.1 THOU/MM3 (ref 0.4–1.3)
MONOCYTES NFR BLD AUTO: 9.3 %
NEUTROPHILS ABSOLUTE: 8.5 THOU/MM3 (ref 1.8–7.7)
NEUTROPHILS NFR BLD AUTO: 73.5 %
NRBC BLD AUTO-RTO: 0 /100 WBC
PLATELET # BLD AUTO: 344 THOU/MM3 (ref 130–400)
PMV BLD AUTO: 11.2 FL (ref 9.4–12.4)
POTASSIUM SERPL-SCNC: 4 MEQ/L (ref 3.5–5.2)
PROT SERPL-MCNC: 7.2 G/DL (ref 6.1–8)
RBC # BLD AUTO: 4.88 MILL/MM3 (ref 4.2–5.4)
SODIUM SERPL-SCNC: 142 MEQ/L (ref 135–145)
WBC # BLD AUTO: 11.5 THOU/MM3 (ref 4.8–10.8)

## 2024-08-19 ENCOUNTER — LAB (OUTPATIENT)
Age: 79
End: 2024-08-19

## 2024-08-19 LAB
ALBUMIN SERPL BCG-MCNC: 3.6 G/DL (ref 3.5–5.1)
ALP SERPL-CCNC: 186 U/L (ref 38–126)
ALT SERPL W/O P-5'-P-CCNC: 26 U/L (ref 11–66)
ANION GAP SERPL CALC-SCNC: 12 MEQ/L (ref 8–16)
AST SERPL-CCNC: 20 U/L (ref 5–40)
BASOPHILS ABSOLUTE: 0.1 THOU/MM3 (ref 0–0.1)
BASOPHILS NFR BLD AUTO: 1.3 %
BILIRUB SERPL-MCNC: 0.3 MG/DL (ref 0.3–1.2)
BUN SERPL-MCNC: 29 MG/DL (ref 7–22)
CALCIUM SERPL-MCNC: 9.5 MG/DL (ref 8.5–10.5)
CHLORIDE SERPL-SCNC: 104 MEQ/L (ref 98–111)
CO2 SERPL-SCNC: 28 MEQ/L (ref 23–33)
CREAT SERPL-MCNC: 1 MG/DL (ref 0.4–1.2)
DEPRECATED RDW RBC AUTO: 49.2 FL (ref 35–45)
EOSINOPHIL NFR BLD AUTO: 1.9 %
EOSINOPHILS ABSOLUTE: 0.2 THOU/MM3 (ref 0–0.4)
ERYTHROCYTE [DISTWIDTH] IN BLOOD BY AUTOMATED COUNT: 14.8 % (ref 11.5–14.5)
GFR SERPL CREATININE-BSD FRML MDRD: 58 ML/MIN/1.73M2
GLUCOSE SERPL-MCNC: 111 MG/DL (ref 70–108)
HCT VFR BLD AUTO: 40.6 % (ref 37–47)
HGB BLD-MCNC: 12.7 GM/DL (ref 12–16)
IMM GRANULOCYTES # BLD AUTO: 0.02 THOU/MM3 (ref 0–0.07)
IMM GRANULOCYTES NFR BLD AUTO: 0.3 %
LYMPHOCYTES ABSOLUTE: 1.7 THOU/MM3 (ref 1–4.8)
LYMPHOCYTES NFR BLD AUTO: 20.9 %
MCH RBC QN AUTO: 28.5 PG (ref 26–33)
MCHC RBC AUTO-ENTMCNC: 31.3 GM/DL (ref 32.2–35.5)
MCV RBC AUTO: 91 FL (ref 81–99)
MONOCYTES ABSOLUTE: 0.7 THOU/MM3 (ref 0.4–1.3)
MONOCYTES NFR BLD AUTO: 8.8 %
NEUTROPHILS ABSOLUTE: 5.3 THOU/MM3 (ref 1.8–7.7)
NEUTROPHILS NFR BLD AUTO: 66.8 %
NRBC BLD AUTO-RTO: 0 /100 WBC
PLATELET # BLD AUTO: 361 THOU/MM3 (ref 130–400)
PMV BLD AUTO: 10 FL (ref 9.4–12.4)
POTASSIUM SERPL-SCNC: 3.6 MEQ/L (ref 3.5–5.2)
PROT SERPL-MCNC: 6.6 G/DL (ref 6.1–8)
RBC # BLD AUTO: 4.46 MILL/MM3 (ref 4.2–5.4)
SODIUM SERPL-SCNC: 144 MEQ/L (ref 135–145)
WBC # BLD AUTO: 8 THOU/MM3 (ref 4.8–10.8)

## 2024-11-19 ENCOUNTER — LAB (OUTPATIENT)
Age: 79
End: 2024-11-19

## 2024-11-19 LAB
ALBUMIN SERPL BCG-MCNC: 4.1 G/DL (ref 3.5–5.1)
ALP SERPL-CCNC: 181 U/L (ref 38–126)
ALT SERPL W/O P-5'-P-CCNC: 23 U/L (ref 11–66)
ANION GAP SERPL CALC-SCNC: 16 MEQ/L (ref 8–16)
AST SERPL-CCNC: 21 U/L (ref 5–40)
BASOPHILS ABSOLUTE: 0.1 THOU/MM3 (ref 0–0.1)
BASOPHILS NFR BLD AUTO: 0.8 %
BILIRUB SERPL-MCNC: 0.4 MG/DL (ref 0.3–1.2)
BUN SERPL-MCNC: 28 MG/DL (ref 7–22)
CALCIUM SERPL-MCNC: 10.6 MG/DL (ref 8.5–10.5)
CHLORIDE SERPL-SCNC: 101 MEQ/L (ref 98–111)
CO2 SERPL-SCNC: 28 MEQ/L (ref 23–33)
CREAT SERPL-MCNC: 0.7 MG/DL (ref 0.4–1.2)
DEPRECATED MEAN GLUCOSE BLD GHB EST-ACNC: 117 MG/DL (ref 70–126)
DEPRECATED RDW RBC AUTO: 45.2 FL (ref 35–45)
EOSINOPHIL NFR BLD AUTO: 2.3 %
EOSINOPHILS ABSOLUTE: 0.2 THOU/MM3 (ref 0–0.4)
ERYTHROCYTE [DISTWIDTH] IN BLOOD BY AUTOMATED COUNT: 14 % (ref 11.5–14.5)
ERYTHROCYTE [SEDIMENTATION RATE] IN BLOOD BY WESTERGREN METHOD: 46 MM/HR (ref 0–20)
GFR SERPL CREATININE-BSD FRML MDRD: 89 ML/MIN/1.73M2
GLUCOSE SERPL-MCNC: 99 MG/DL (ref 70–108)
HBA1C MFR BLD HPLC: 5.9 % (ref 4.4–6.4)
HCT VFR BLD AUTO: 42.2 % (ref 37–47)
HGB BLD-MCNC: 13.7 GM/DL (ref 12–16)
IMM GRANULOCYTES # BLD AUTO: 0.03 THOU/MM3 (ref 0–0.07)
IMM GRANULOCYTES NFR BLD AUTO: 0.3 %
LYMPHOCYTES ABSOLUTE: 1.5 THOU/MM3 (ref 1–4.8)
LYMPHOCYTES NFR BLD AUTO: 16.6 %
MCH RBC QN AUTO: 28.9 PG (ref 26–33)
MCHC RBC AUTO-ENTMCNC: 32.5 GM/DL (ref 32.2–35.5)
MCV RBC AUTO: 89 FL (ref 81–99)
MONOCYTES ABSOLUTE: 0.9 THOU/MM3 (ref 0.4–1.3)
MONOCYTES NFR BLD AUTO: 10.1 %
NEUTROPHILS ABSOLUTE: 6.4 THOU/MM3 (ref 1.8–7.7)
NEUTROPHILS NFR BLD AUTO: 69.9 %
NRBC BLD AUTO-RTO: 0 /100 WBC
PLATELET # BLD AUTO: 235 THOU/MM3 (ref 130–400)
PMV BLD AUTO: 11.8 FL (ref 9.4–12.4)
POTASSIUM SERPL-SCNC: 3.5 MEQ/L (ref 3.5–5.2)
PROT SERPL-MCNC: 7.7 G/DL (ref 6.1–8)
RBC # BLD AUTO: 4.74 MILL/MM3 (ref 4.2–5.4)
SODIUM SERPL-SCNC: 145 MEQ/L (ref 135–145)
URATE SERPL-MCNC: 9.4 MG/DL (ref 2.4–5.7)
WBC # BLD AUTO: 9.2 THOU/MM3 (ref 4.8–10.8)

## 2025-01-30 ENCOUNTER — LAB (OUTPATIENT)
Age: 80
End: 2025-01-30

## 2025-01-30 LAB
ANION GAP SERPL CALC-SCNC: 15 MEQ/L (ref 8–16)
BUN SERPL-MCNC: 27 MG/DL (ref 7–22)
CALCIUM SERPL-MCNC: 10 MG/DL (ref 8.5–10.5)
CHLORIDE SERPL-SCNC: 105 MEQ/L (ref 98–111)
CO2 SERPL-SCNC: 26 MEQ/L (ref 23–33)
CREAT SERPL-MCNC: 0.8 MG/DL (ref 0.4–1.2)
ERYTHROCYTE [SEDIMENTATION RATE] IN BLOOD BY WESTERGREN METHOD: 40 MM/HR (ref 0–20)
GFR SERPL CREATININE-BSD FRML MDRD: 75 ML/MIN/1.73M2
GLUCOSE SERPL-MCNC: 83 MG/DL (ref 70–108)
POTASSIUM SERPL-SCNC: 3.6 MEQ/L (ref 3.5–5.2)
SODIUM SERPL-SCNC: 146 MEQ/L (ref 135–145)
URATE SERPL-MCNC: 8.7 MG/DL (ref 2.4–5.7)

## 2025-04-01 ENCOUNTER — LAB (OUTPATIENT)
Age: 80
End: 2025-04-01

## 2025-04-01 LAB
ALBUMIN SERPL BCG-MCNC: 3.9 G/DL (ref 3.4–4.9)
ALP SERPL-CCNC: 169 U/L (ref 35–104)
ALT SERPL W/O P-5'-P-CCNC: 29 U/L (ref 10–35)
ANION GAP SERPL CALC-SCNC: 12 MEQ/L (ref 8–16)
AST SERPL-CCNC: 23 U/L (ref 10–35)
BASOPHILS ABSOLUTE: 0.1 THOU/MM3 (ref 0–0.1)
BASOPHILS NFR BLD AUTO: 1 %
BILIRUB SERPL-MCNC: 0.3 MG/DL (ref 0.3–1.2)
BUN SERPL-MCNC: 28 MG/DL (ref 8–23)
CALCIUM SERPL-MCNC: 9.7 MG/DL (ref 8.8–10.2)
CHLORIDE SERPL-SCNC: 104 MEQ/L (ref 98–111)
CHOLEST SERPL-MCNC: 198 MG/DL (ref 100–199)
CO2 SERPL-SCNC: 28 MEQ/L (ref 22–29)
CREAT SERPL-MCNC: 0.9 MG/DL (ref 0.5–0.9)
DEPRECATED MEAN GLUCOSE BLD GHB EST-ACNC: 132 MG/DL (ref 70–126)
DEPRECATED RDW RBC AUTO: 46.5 FL (ref 35–45)
EOSINOPHIL NFR BLD AUTO: 3.8 %
EOSINOPHILS ABSOLUTE: 0.3 THOU/MM3 (ref 0–0.4)
ERYTHROCYTE [DISTWIDTH] IN BLOOD BY AUTOMATED COUNT: 14 % (ref 11.5–14.5)
GFR SERPL CREATININE-BSD FRML MDRD: 65 ML/MIN/1.73M2
GLUCOSE SERPL-MCNC: 194 MG/DL (ref 74–109)
HBA1C MFR BLD HPLC: 6.4 % (ref 4–6)
HCT VFR BLD AUTO: 41.7 % (ref 37–47)
HDLC SERPL-MCNC: 55 MG/DL
HGB BLD-MCNC: 13 GM/DL (ref 12–16)
IMM GRANULOCYTES # BLD AUTO: 0.03 THOU/MM3 (ref 0–0.07)
IMM GRANULOCYTES NFR BLD AUTO: 0.4 %
LDLC SERPL CALC-MCNC: 116 MG/DL
LYMPHOCYTES ABSOLUTE: 1.7 THOU/MM3 (ref 1–4.8)
LYMPHOCYTES NFR BLD AUTO: 20.1 %
MCH RBC QN AUTO: 28.3 PG (ref 26–33)
MCHC RBC AUTO-ENTMCNC: 31.2 GM/DL (ref 32.2–35.5)
MCV RBC AUTO: 90.8 FL (ref 81–99)
MONOCYTES ABSOLUTE: 0.6 THOU/MM3 (ref 0.4–1.3)
MONOCYTES NFR BLD AUTO: 6.9 %
NEUTROPHILS ABSOLUTE: 5.7 THOU/MM3 (ref 1.8–7.7)
NEUTROPHILS NFR BLD AUTO: 67.8 %
NRBC BLD AUTO-RTO: 0 /100 WBC
PLATELET # BLD AUTO: 307 THOU/MM3 (ref 130–400)
PMV BLD AUTO: 10.4 FL (ref 9.4–12.4)
POTASSIUM SERPL-SCNC: 3.4 MEQ/L (ref 3.5–5.2)
PROT SERPL-MCNC: 7 G/DL (ref 6.4–8.3)
RBC # BLD AUTO: 4.59 MILL/MM3 (ref 4.2–5.4)
SODIUM SERPL-SCNC: 144 MEQ/L (ref 135–145)
T4 FREE SERPL-MCNC: 1.1 NG/DL (ref 0.92–1.68)
TRIGL SERPL-MCNC: 137 MG/DL (ref 0–199)
TSH SERPL DL<=0.05 MIU/L-ACNC: 2 UIU/ML (ref 0.27–4.2)
WBC # BLD AUTO: 8.4 THOU/MM3 (ref 4.8–10.8)

## 2025-04-02 LAB
CREAT UR-MCNC: 52.7 MG/DL
MICROALBUMIN UR-MCNC: < 2 MG/DL
MICROALBUMIN/CREAT RATIO PNL UR: 38 MG/G (ref 0–30)

## 2025-04-08 ENCOUNTER — LAB (OUTPATIENT)
Age: 80
End: 2025-04-08

## 2025-04-11 LAB — ALKALINE PHOSPHATASE ISOENZYMES: NORMAL

## 2025-04-15 ENCOUNTER — LAB (OUTPATIENT)
Age: 80
End: 2025-04-15

## 2025-04-15 LAB
ALBUMIN SERPL BCG-MCNC: 3.9 G/DL (ref 3.4–4.9)
ALP SERPL-CCNC: 180 U/L (ref 38–126)
ALT SERPL W/O P-5'-P-CCNC: 23 U/L (ref 10–35)
AMYLASE SERPL-CCNC: 48 U/L (ref 28–100)
ANION GAP SERPL CALC-SCNC: 13 MEQ/L (ref 8–16)
AST SERPL-CCNC: 23 U/L (ref 10–35)
BILIRUB SERPL-MCNC: 0.4 MG/DL (ref 0.3–1.2)
BUN SERPL-MCNC: 29 MG/DL (ref 8–23)
CALCIUM SERPL-MCNC: 10.3 MG/DL (ref 8.8–10.2)
CHLORIDE SERPL-SCNC: 103 MEQ/L (ref 98–111)
CO2 SERPL-SCNC: 28 MEQ/L (ref 22–29)
CREAT SERPL-MCNC: 0.8 MG/DL (ref 0.5–0.9)
GFR SERPL CREATININE-BSD FRML MDRD: 75 ML/MIN/1.73M2
GLUCOSE SERPL-MCNC: 94 MG/DL (ref 74–109)
LIPASE SERPL-CCNC: 23 U/L (ref 13–60)
POTASSIUM SERPL-SCNC: 3.3 MEQ/L (ref 3.5–5.2)
PROT SERPL-MCNC: 6.9 G/DL (ref 6.4–8.3)
SODIUM SERPL-SCNC: 144 MEQ/L (ref 135–145)

## 2025-04-25 ENCOUNTER — LAB (OUTPATIENT)
Age: 80
End: 2025-04-25

## 2025-04-25 LAB
ANION GAP SERPL CALC-SCNC: 11 MEQ/L (ref 8–16)
BUN SERPL-MCNC: 32 MG/DL (ref 8–23)
CALCIUM SERPL-MCNC: 10.4 MG/DL (ref 8.8–10.2)
CHLORIDE SERPL-SCNC: 105 MEQ/L (ref 98–111)
CO2 SERPL-SCNC: 29 MEQ/L (ref 22–29)
CREAT SERPL-MCNC: 0.9 MG/DL (ref 0.5–0.9)
GFR SERPL CREATININE-BSD FRML MDRD: 65 ML/MIN/1.73M2
GLUCOSE SERPL-MCNC: 109 MG/DL (ref 74–109)
POTASSIUM SERPL-SCNC: 4.3 MEQ/L (ref 3.5–5.2)
SODIUM SERPL-SCNC: 145 MEQ/L (ref 135–145)

## 2025-05-20 ENCOUNTER — LAB (OUTPATIENT)
Age: 80
End: 2025-05-20

## 2025-05-20 LAB
ANION GAP SERPL CALC-SCNC: 10 MEQ/L (ref 8–16)
BUN SERPL-MCNC: 23 MG/DL (ref 8–23)
CALCIUM SERPL-MCNC: 10 MG/DL (ref 8.8–10.2)
CHLORIDE SERPL-SCNC: 106 MEQ/L (ref 98–111)
CO2 SERPL-SCNC: 30 MEQ/L (ref 22–29)
CREAT SERPL-MCNC: 0.8 MG/DL (ref 0.5–0.9)
GFR SERPL CREATININE-BSD FRML MDRD: 75 ML/MIN/1.73M2
GLUCOSE SERPL-MCNC: 120 MG/DL (ref 74–109)
POTASSIUM SERPL-SCNC: 4.2 MEQ/L (ref 3.5–5.2)
SODIUM SERPL-SCNC: 146 MEQ/L (ref 135–145)

## 2025-05-22 LAB — CA-I SERPL ISE-SCNC: NORMAL MMOL/L
